# Patient Record
Sex: FEMALE | Race: OTHER | HISPANIC OR LATINO | URBAN - METROPOLITAN AREA
[De-identification: names, ages, dates, MRNs, and addresses within clinical notes are randomized per-mention and may not be internally consistent; named-entity substitution may affect disease eponyms.]

---

## 2020-01-20 ENCOUNTER — EMERGENCY (EMERGENCY)
Facility: HOSPITAL | Age: 65
LOS: 1 days | Discharge: HOME | End: 2020-01-20
Attending: EMERGENCY MEDICINE | Admitting: EMERGENCY MEDICINE
Payer: COMMERCIAL

## 2020-01-20 VITALS
DIASTOLIC BLOOD PRESSURE: 67 MMHG | HEART RATE: 77 BPM | OXYGEN SATURATION: 98 % | SYSTOLIC BLOOD PRESSURE: 140 MMHG | TEMPERATURE: 98 F | RESPIRATION RATE: 16 BRPM

## 2020-01-20 VITALS
TEMPERATURE: 98 F | OXYGEN SATURATION: 97 % | DIASTOLIC BLOOD PRESSURE: 75 MMHG | HEART RATE: 85 BPM | RESPIRATION RATE: 17 BRPM | SYSTOLIC BLOOD PRESSURE: 143 MMHG

## 2020-01-20 PROCEDURE — 73502 X-RAY EXAM HIP UNI 2-3 VIEWS: CPT | Mod: 26,LT

## 2020-01-20 PROCEDURE — 73130 X-RAY EXAM OF HAND: CPT | Mod: 26,RT

## 2020-01-20 PROCEDURE — 99284 EMERGENCY DEPT VISIT MOD MDM: CPT

## 2020-01-20 PROCEDURE — 70450 CT HEAD/BRAIN W/O DYE: CPT | Mod: 26

## 2020-01-20 RX ORDER — ACETAMINOPHEN 500 MG
975 TABLET ORAL ONCE
Refills: 0 | Status: COMPLETED | OUTPATIENT
Start: 2020-01-20 | End: 2020-01-20

## 2020-01-20 RX ADMIN — Medication 975 MILLIGRAM(S): at 14:49

## 2020-01-20 NOTE — ED PROVIDER NOTE - NS ED ROS FT
Gen: Denies fever  CV: Denies chest pain  Skin: Denies rash, erythema  Resp: Denies SOB, cough  GI: Denies abd pain, nausea, vomiting  Msk: + L hip pain, R finger pain Denies back pain, LE swelling, extremity pain  Neuro: +headache Denies LOC, weakness
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
## 2020-01-20 NOTE — ED PROVIDER NOTE - PHYSICAL EXAMINATION
Gen: WDWN, NAD, tearful during interview   HEENT: AT/NC, EOMI, no nasal discharge, mucous membranes moist, tenderness at the lateral aspect of the L eye, no bruising, erythema or obvious deformity.   Neck: supple, full ROM without pain  CV: RRR, +S1/S2, no M/R/G, no chest wall tenderness   Resp: CTAB, no W/R/R  GI: Abdomen soft non-distended, NTTP  MSK: No open wounds, no bruising, no LE edema. no pelvic instability. ttp of the L lateral hip, able to range with mild discomfort. ambulating well with smooth gait.   Neuro: A&Ox4, following commands, moving all four extremities spontaneously, sensation intact, strength intact.   Psych: appropriate mood
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
## 2020-01-20 NOTE — ED PROVIDER NOTE - NSFOLLOWUPINSTRUCTIONS_ED_ALL_ED_FT
you were seen today for left hip pain and left head pain.     you had xrays and a CT performed which didn't show any emergent finding.     You can take tylenol 500-1000mg or motrin 600mg for pain as needed.    Follow up with your primary care doctor this week.     Return to the ED if you have any new or worsening pain including but not limited to the following: worsening/severe pain, difficulty walking, numbness tingling.

## 2020-01-20 NOTE — ED PROVIDER NOTE - PATIENT PORTAL LINK FT
You can access the FollowMyHealth Patient Portal offered by [facility] by registering at the following website: [email or web address]. By joining [email or web address]’s FollowMyHealth portal, you will also be able to view your health information using other applications (apps) compatible with our system.

## 2020-06-03 ENCOUNTER — RESULT REVIEW (OUTPATIENT)
Age: 65
End: 2020-06-03

## 2020-09-16 ENCOUNTER — RESULT REVIEW (OUTPATIENT)
Age: 65
End: 2020-09-16

## 2020-12-09 ENCOUNTER — EMERGENCY (EMERGENCY)
Facility: HOSPITAL | Age: 65
LOS: 1 days | Discharge: HOME | End: 2020-12-09
Attending: EMERGENCY MEDICINE | Admitting: EMERGENCY MEDICINE
Payer: COMMERCIAL

## 2020-12-09 VITALS
OXYGEN SATURATION: 98 % | DIASTOLIC BLOOD PRESSURE: 79 MMHG | TEMPERATURE: 98 F | RESPIRATION RATE: 18 BRPM | HEART RATE: 85 BPM | SYSTOLIC BLOOD PRESSURE: 130 MMHG

## 2020-12-09 DIAGNOSIS — O34.219 MATERNAL CARE FOR UNSPECIFIED TYPE SCAR FROM PREVIOUS CESAREAN DELIVERY: Chronic | ICD-10-CM

## 2020-12-09 LAB
ALBUMIN SERPL ELPH-MCNC: 3.9 G/DL — SIGNIFICANT CHANGE UP (ref 3.3–5)
ALP SERPL-CCNC: 107 U/L — SIGNIFICANT CHANGE UP (ref 40–120)
ALT FLD-CCNC: 30 U/L — SIGNIFICANT CHANGE UP (ref 4–33)
ANION GAP SERPL CALC-SCNC: 8 MMOL/L — SIGNIFICANT CHANGE UP (ref 7–14)
APPEARANCE UR: CLEAR — SIGNIFICANT CHANGE UP
AST SERPL-CCNC: 20 U/L — SIGNIFICANT CHANGE UP (ref 4–32)
BASOPHILS # BLD AUTO: 0.01 K/UL — SIGNIFICANT CHANGE UP (ref 0–0.2)
BASOPHILS NFR BLD AUTO: 0.2 % — SIGNIFICANT CHANGE UP (ref 0–2)
BILIRUB SERPL-MCNC: <0.2 MG/DL — SIGNIFICANT CHANGE UP (ref 0.2–1.2)
BILIRUB UR-MCNC: NEGATIVE — SIGNIFICANT CHANGE UP
BUN SERPL-MCNC: 10 MG/DL — SIGNIFICANT CHANGE UP (ref 7–23)
CALCIUM SERPL-MCNC: 9.2 MG/DL — SIGNIFICANT CHANGE UP (ref 8.4–10.5)
CHLORIDE SERPL-SCNC: 104 MMOL/L — SIGNIFICANT CHANGE UP (ref 98–107)
CO2 SERPL-SCNC: 28 MMOL/L — SIGNIFICANT CHANGE UP (ref 22–31)
COLOR SPEC: SIGNIFICANT CHANGE UP
CREAT SERPL-MCNC: 0.7 MG/DL — SIGNIFICANT CHANGE UP (ref 0.5–1.3)
DIFF PNL FLD: ABNORMAL
EOSINOPHIL # BLD AUTO: 0.08 K/UL — SIGNIFICANT CHANGE UP (ref 0–0.5)
EOSINOPHIL NFR BLD AUTO: 1.3 % — SIGNIFICANT CHANGE UP (ref 0–6)
GLUCOSE SERPL-MCNC: 93 MG/DL — SIGNIFICANT CHANGE UP (ref 70–99)
GLUCOSE UR QL: NEGATIVE — SIGNIFICANT CHANGE UP
HCT VFR BLD CALC: 43.1 % — SIGNIFICANT CHANGE UP (ref 34.5–45)
HGB BLD-MCNC: 13.3 G/DL — SIGNIFICANT CHANGE UP (ref 11.5–15.5)
IANC: 3.8 K/UL — SIGNIFICANT CHANGE UP (ref 1.5–8.5)
IMM GRANULOCYTES NFR BLD AUTO: 0.5 % — SIGNIFICANT CHANGE UP (ref 0–1.5)
KETONES UR-MCNC: NEGATIVE — SIGNIFICANT CHANGE UP
LEUKOCYTE ESTERASE UR-ACNC: NEGATIVE — SIGNIFICANT CHANGE UP
LYMPHOCYTES # BLD AUTO: 1.67 K/UL — SIGNIFICANT CHANGE UP (ref 1–3.3)
LYMPHOCYTES # BLD AUTO: 26.6 % — SIGNIFICANT CHANGE UP (ref 13–44)
MCHC RBC-ENTMCNC: 29.3 PG — SIGNIFICANT CHANGE UP (ref 27–34)
MCHC RBC-ENTMCNC: 30.9 GM/DL — LOW (ref 32–36)
MCV RBC AUTO: 94.9 FL — SIGNIFICANT CHANGE UP (ref 80–100)
MONOCYTES # BLD AUTO: 0.69 K/UL — SIGNIFICANT CHANGE UP (ref 0–0.9)
MONOCYTES NFR BLD AUTO: 11 % — SIGNIFICANT CHANGE UP (ref 2–14)
NEUTROPHILS # BLD AUTO: 3.8 K/UL — SIGNIFICANT CHANGE UP (ref 1.8–7.4)
NEUTROPHILS NFR BLD AUTO: 60.4 % — SIGNIFICANT CHANGE UP (ref 43–77)
NITRITE UR-MCNC: NEGATIVE — SIGNIFICANT CHANGE UP
NRBC # BLD: 0 /100 WBCS — SIGNIFICANT CHANGE UP
NRBC # FLD: 0 K/UL — SIGNIFICANT CHANGE UP
PH UR: 6.5 — SIGNIFICANT CHANGE UP (ref 5–8)
PLATELET # BLD AUTO: 282 K/UL — SIGNIFICANT CHANGE UP (ref 150–400)
POTASSIUM SERPL-MCNC: 3.8 MMOL/L — SIGNIFICANT CHANGE UP (ref 3.5–5.3)
POTASSIUM SERPL-SCNC: 3.8 MMOL/L — SIGNIFICANT CHANGE UP (ref 3.5–5.3)
PROT SERPL-MCNC: 6.9 G/DL — SIGNIFICANT CHANGE UP (ref 6–8.3)
PROT UR-MCNC: NEGATIVE — SIGNIFICANT CHANGE UP
RBC # BLD: 4.54 M/UL — SIGNIFICANT CHANGE UP (ref 3.8–5.2)
RBC # FLD: 13.5 % — SIGNIFICANT CHANGE UP (ref 10.3–14.5)
SODIUM SERPL-SCNC: 140 MMOL/L — SIGNIFICANT CHANGE UP (ref 135–145)
SP GR SPEC: 1.01 — LOW (ref 1.01–1.02)
UROBILINOGEN FLD QL: SIGNIFICANT CHANGE UP
WBC # BLD: 6.28 K/UL — SIGNIFICANT CHANGE UP (ref 3.8–10.5)
WBC # FLD AUTO: 6.28 K/UL — SIGNIFICANT CHANGE UP (ref 3.8–10.5)

## 2020-12-09 PROCEDURE — 74177 CT ABD & PELVIS W/CONTRAST: CPT | Mod: 26

## 2020-12-09 PROCEDURE — 99284 EMERGENCY DEPT VISIT MOD MDM: CPT

## 2020-12-09 RX ORDER — MOXIFLOXACIN HYDROCHLORIDE TABLETS, 400 MG 400 MG/1
1 TABLET, FILM COATED ORAL
Qty: 20 | Refills: 0
Start: 2020-12-09 | End: 2020-12-18

## 2020-12-09 RX ORDER — METRONIDAZOLE 500 MG
1 TABLET ORAL
Qty: 30 | Refills: 0
Start: 2020-12-09 | End: 2020-12-18

## 2020-12-09 RX ORDER — SODIUM CHLORIDE 9 MG/ML
1000 INJECTION INTRAMUSCULAR; INTRAVENOUS; SUBCUTANEOUS ONCE
Refills: 0 | Status: COMPLETED | OUTPATIENT
Start: 2020-12-09 | End: 2020-12-09

## 2020-12-09 RX ORDER — OXYCODONE AND ACETAMINOPHEN 5; 325 MG/1; MG/1
1 TABLET ORAL ONCE
Refills: 0 | Status: DISCONTINUED | OUTPATIENT
Start: 2020-12-09 | End: 2020-12-09

## 2020-12-09 RX ORDER — METRONIDAZOLE 500 MG
500 TABLET ORAL ONCE
Refills: 0 | Status: COMPLETED | OUTPATIENT
Start: 2020-12-09 | End: 2020-12-09

## 2020-12-09 RX ORDER — MORPHINE SULFATE 50 MG/1
4 CAPSULE, EXTENDED RELEASE ORAL ONCE
Refills: 0 | Status: DISCONTINUED | OUTPATIENT
Start: 2020-12-09 | End: 2020-12-09

## 2020-12-09 RX ORDER — CIPROFLOXACIN LACTATE 400MG/40ML
500 VIAL (ML) INTRAVENOUS ONCE
Refills: 0 | Status: COMPLETED | OUTPATIENT
Start: 2020-12-09 | End: 2020-12-09

## 2020-12-09 RX ADMIN — MORPHINE SULFATE 4 MILLIGRAM(S): 50 CAPSULE, EXTENDED RELEASE ORAL at 12:18

## 2020-12-09 RX ADMIN — Medication 500 MILLIGRAM(S): at 14:44

## 2020-12-09 RX ADMIN — OXYCODONE AND ACETAMINOPHEN 1 TABLET(S): 5; 325 TABLET ORAL at 14:52

## 2020-12-09 RX ADMIN — SODIUM CHLORIDE 1000 MILLILITER(S): 9 INJECTION INTRAMUSCULAR; INTRAVENOUS; SUBCUTANEOUS at 12:18

## 2020-12-09 NOTE — ED PROVIDER NOTE - PROGRESS NOTE DETAILS
CARLOS EDUARDO Meza: Pt reassessed and notes improved pain though not completely resolved. Pt offered admission/CDU vs D/C with pain control and abx, pt prefers to go home, will provide return precautions and D/C.

## 2020-12-09 NOTE — ED PROVIDER NOTE - CLINICAL SUMMARY MEDICAL DECISION MAKING FREE TEXT BOX
64 y/o female with h/o diverticulitis p/w diarrhea abd pain.  Likely diverticulitis vs colitis vs viral syndrome.  Obtain cbc cmp ct a/p give ivf pain med reassess.

## 2020-12-09 NOTE — ED PROVIDER NOTE - OBJECTIVE STATEMENT
65 Y/O F 64 y/o female h/o diverticulitis here with c/o lower abd pain and diarrhea since yesterday.  Feels similar to diverticulitis in the past.  Contrary to triage note denies any pelvic pain.  Denies f/c, dysuria.

## 2020-12-09 NOTE — ED PROVIDER NOTE - PATIENT PORTAL LINK FT
You can access the FollowMyHealth Patient Portal offered by [facility] by registering at the following website: [email or web address]. By joining [email or web address]’s FollowMyHealth portal, you will also be able to view your health information using other applications (apps) compatible with our system.

## 2020-12-09 NOTE — ED PROVIDER NOTE - ATTENDING CONTRIBUTION TO CARE
DR. MANDUJANO, ATTENDING MD-  I performed a face to face bedside interview with the patient regarding history of present illness, review of symptoms and past medical history. I completed an independent physical exam.  I have discussed the patient's plan of care with the PA.   Documentation as above in the note.    HPI / ROS / PE / MDM written by myself.

## 2020-12-09 NOTE — ED PROVIDER NOTE - NSFOLLOWUPINSTRUCTIONS_ED_ALL_ED_FT
Take Cipro every 12 hours for pain and Flagyl every 8 hours. Take Percocet every 6 hours as needed for pain, do not drive after use. Follow up with your primary care physician in 48-72 hours- bring copies of your results.  Return to the ER for worsening or persistent symptoms, and/or ANY NEW OR CONCERNING SYMPTOMS. THIS INCLUDES BUT IS NOT LIMITED TO FEVER, CHILLS, NIGHTSWEATS, INCREASING PAIN, NAUSEA, VOMITING OR FOR ANY OTHER CHANGES THAT CONCERN YOU. If you have issues obtaining follow up, please call: [phone] to obtain a doctor or specialist who takes your insurance in your area.  You may call [phone] to make an appointment with the internal medicine clinic.

## 2020-12-09 NOTE — ED ADULT NURSE NOTE - OBJECTIVE STATEMENT
pt received at intake rm 9 AAO x 3. pt reports intermittent b/l lower abdominal pain for several days. pt c/o pain is constant now associated with diarrhea since yesterday. pt denies sob, chest pain, n/v, fevers, chills, cough, urinary symptoms, abnormal vaginal d/c. respirations even and unlabored. 20g iv placed to left ac.

## 2020-12-09 NOTE — ED ADULT TRIAGE NOTE - CHIEF COMPLAINT QUOTE
lower pelvic pain since yesterday with diarrhea. denies dysuria or hematuria denies vaginal bleeding.

## 2020-12-10 LAB
CULTURE RESULTS: SIGNIFICANT CHANGE UP
SPECIMEN SOURCE: SIGNIFICANT CHANGE UP

## 2020-12-14 PROBLEM — K57.92 DIVERTICULITIS OF INTESTINE, PART UNSPECIFIED, WITHOUT PERFORATION OR ABSCESS WITHOUT BLEEDING: Chronic | Status: ACTIVE | Noted: 2020-12-09

## 2020-12-15 ENCOUNTER — APPOINTMENT (OUTPATIENT)
Dept: ORTHOPEDIC SURGERY | Facility: CLINIC | Age: 65
End: 2020-12-15
Payer: COMMERCIAL

## 2020-12-15 VITALS
WEIGHT: 150 LBS | BODY MASS INDEX: 26.58 KG/M2 | SYSTOLIC BLOOD PRESSURE: 146 MMHG | DIASTOLIC BLOOD PRESSURE: 79 MMHG | HEIGHT: 63 IN | OXYGEN SATURATION: 96 % | HEART RATE: 89 BPM

## 2020-12-15 PROCEDURE — 99072 ADDL SUPL MATRL&STAF TM PHE: CPT

## 2020-12-15 PROCEDURE — 99204 OFFICE O/P NEW MOD 45 MIN: CPT

## 2020-12-15 NOTE — PHYSICAL EXAM
[de-identified] : Examination of the lumbar spine discloses tenderness to palpation in the mid and left lumbar region. Mild positive left leg raise at 65 degrees.\par Forward flexion 75 degrees.  DTRs 2+ equal No acute motor or sensory deficits to the LEs \par Examination of the hips is unremarkable [de-identified] : Review of prior xrays of the lumbar spine disclosed degenerative changes with spondylolethsesis

## 2020-12-15 NOTE — HISTORY OF PRESENT ILLNESS
[___ mths] : [unfilled] month(s) ago [7] : an average pain level of 7/10 [0] : a minimum pain level of 0/10 [10] : a maximum pain level of 10/10 [Standing] : standing [Intermit.] : ~He/She~ states the symptoms seem to be intermittent [Bending] : worsened by bending [Sitting] : worsened by sitting [Walking] : worsened by walking [Rest] : relieved by rest [de-identified] : Pt presents for initial evaluation with pain in her hips, pt is pointing to her right and left buttocks. Pt states there is no known injury, she states she has radiating pain to the left calf. Pt has taken Naprosyn with some relief.  Pt has not had any injections, pt has had physical therapy for her back approx. 3/4 years ago.Pt has seen her PCP Dr Jamaal Bowden who order xrays of her LS spine .11/11/20 Pt has diverticulosis. [de-identified] : certain movements. [de-identified] : medication

## 2020-12-15 NOTE — DISCUSSION/SUMMARY
[de-identified] : The patient was advised of her findings. She was referred to [facility] and given the name of a spine specialist for further definitive care and treatment

## 2020-12-21 ENCOUNTER — APPOINTMENT (OUTPATIENT)
Dept: ORTHOPEDIC SURGERY | Facility: CLINIC | Age: 65
End: 2020-12-21
Payer: COMMERCIAL

## 2020-12-21 VITALS
HEART RATE: 87 BPM | BODY MASS INDEX: 26.58 KG/M2 | DIASTOLIC BLOOD PRESSURE: 79 MMHG | WEIGHT: 150 LBS | HEIGHT: 63 IN | SYSTOLIC BLOOD PRESSURE: 134 MMHG

## 2020-12-21 PROCEDURE — 72110 X-RAY EXAM L-2 SPINE 4/>VWS: CPT

## 2020-12-21 PROCEDURE — 99072 ADDL SUPL MATRL&STAF TM PHE: CPT

## 2020-12-21 PROCEDURE — 99215 OFFICE O/P EST HI 40 MIN: CPT

## 2020-12-21 NOTE — PHYSICAL EXAM
[de-identified] : Lumbar Physical Exam\par \par Gait -slightly antalgic\par \par Station -normal\par \par Sagittal balance -normal\par \par Compensatory mechanism? -None\par \par Heel walk -normal\par \par Toe walk -normal\par \par Reflexes\par Patellar -normal\par Gastroc -normal\par Clonus -no\par \par Hip Exam -normal\par \par Straight leg raise -positive on the left\par \par Pulses -2+ DP/PT\par \par Range of motion -globally reduced\par \par Sensation \par Sensation intact to light touch in L1, L2, L3, L4, L5 and S1 dermatomes bilaterally\par \par Motor\par 	IP	Quad	HS	TA	Gastroc	EHL\par Right	5/5	5/5	5/5	5/5	5/5	5/5\par Left	4/5	5/5	5/5	5/5	5/5	5/5\par \par  [de-identified] : Lumbar radiographs\par Lumbar lordosis maintained\par Degenerative scoliosis noted on AP films\par L4-L5 spondylolisthesis noted\par I do note motion on flexion-extension/lateral films

## 2020-12-21 NOTE — HISTORY OF PRESENT ILLNESS
[de-identified] : This is a 55-year-old female here today for evaluation of her low back and left radicular type symptoms.  The symptoms have been ongoing for the past 3 years.  Unfortunately the past 6 to 8 months he has had acute worsening of the symptoms.  She has tried physical therapy and NSAIDs but has had limited symptom relief.  She can only walk 3 blocks when her pain is bad.  Down her left leg she has numbness and pain over her left lateral thigh into her left lateral calf.  She does endorse mechanical back pain and that she has pain when she gets up from a sitting position.

## 2020-12-21 NOTE — ASSESSMENT
[FreeTextEntry1] : This is a 65-year-old female here today for very low back and lumbar radicular type symptoms.  These have been worsening over the past 6 weeks despite conservative treatment which has included physical therapy, NSAIDs.  Therefore I would like to obtain a lumbar MRI.  I would like her to continue with physical therapy with new modalities of treatment including core strengthening and lumbar stretching.  I did  the patient on appropriate regimen of Tylenol.  She did state that her NSAID use has caused her to have some stomach problems so I think she should stick with Tylenol at this point.  I will have her follow-up in 2 to 3 weeks to go over her imaging findings and to discuss further treatment options.  She knows to call in interim if her symptoms change or worsen in any way.\par \par The patient has tried the following treatments:\par Activity modification          +\par Ice/Compression                  +\par Braces                                     -\par NSAIDS                                   +\par Physical Therapy                   + \par Please note these modalities of treatment have been attempted for more than 6 weeks\par

## 2021-01-06 ENCOUNTER — OUTPATIENT (OUTPATIENT)
Dept: OUTPATIENT SERVICES | Facility: HOSPITAL | Age: 66
LOS: 1 days | End: 2021-01-06
Payer: COMMERCIAL

## 2021-01-06 ENCOUNTER — RESULT REVIEW (OUTPATIENT)
Age: 66
End: 2021-01-06

## 2021-01-06 ENCOUNTER — APPOINTMENT (OUTPATIENT)
Dept: MRI IMAGING | Facility: IMAGING CENTER | Age: 66
End: 2021-01-06
Payer: COMMERCIAL

## 2021-01-06 DIAGNOSIS — M54.16 RADICULOPATHY, LUMBAR REGION: ICD-10-CM

## 2021-01-06 DIAGNOSIS — O34.219 MATERNAL CARE FOR UNSPECIFIED TYPE SCAR FROM PREVIOUS CESAREAN DELIVERY: Chronic | ICD-10-CM

## 2021-01-06 PROCEDURE — 72148 MRI LUMBAR SPINE W/O DYE: CPT | Mod: 26

## 2021-01-06 PROCEDURE — 72148 MRI LUMBAR SPINE W/O DYE: CPT

## 2021-01-13 ENCOUNTER — APPOINTMENT (OUTPATIENT)
Dept: ORTHOPEDIC SURGERY | Facility: CLINIC | Age: 66
End: 2021-01-13
Payer: COMMERCIAL

## 2021-01-13 VITALS
HEIGHT: 63 IN | WEIGHT: 150 LBS | SYSTOLIC BLOOD PRESSURE: 145 MMHG | DIASTOLIC BLOOD PRESSURE: 85 MMHG | BODY MASS INDEX: 26.58 KG/M2 | HEART RATE: 76 BPM | OXYGEN SATURATION: 96 %

## 2021-01-13 PROCEDURE — 99214 OFFICE O/P EST MOD 30 MIN: CPT

## 2021-01-13 PROCEDURE — 99072 ADDL SUPL MATRL&STAF TM PHE: CPT

## 2021-01-13 NOTE — ASSESSMENT
[FreeTextEntry1] : I had a lengthy discussion with the patient in terms of her treatment plan and diagnosis.  She certainly has canal stenosis and motion at L4-L5 which is likely contributing to her back and leg pain.  Given her symptoms and imaging findings I do think we should first proceed with a lumbar epidural steroid injection.  I prescribed her an L4-L5 translaminar epidural steroid injection which I am hopeful will be helpful for her symptoms.  I will see her again in 2 to 3 weeks for repeat clinical evaluation.  She knows to call back sooner if her symptoms worsen or change.

## 2021-01-13 NOTE — PHYSICAL EXAM
[de-identified] : Lumbar Physical Exam\par \par Gait -slightly antalgic\par \par Station -normal\par \par Sagittal balance -normal\par \par Compensatory mechanism? -None\par \par Heel walk -normal\par \par Toe walk -normal\par \par Reflexes\par Patellar -normal\par Gastroc -normal\par Clonus -no\par \par Hip Exam -normal\par \par Straight leg raise -positive on the left\par \par Pulses -2+ DP/PT\par \par Range of motion -globally reduced\par \par Sensation \par Sensation intact to light touch in L1, L2, L3, L4, L5 and S1 dermatomes bilaterally\par \par Motor\par 	IP	Quad	HS	TA	Gastroc	EHL\par Right	5/5	5/5	5/5	5/5	5/5	5/5\par Left	4/5	5/5	5/5	5/5	5/5	5/5\par \par Exam unchanged today [de-identified] : Lumbar MRI\par Moderate canal stenosis at L4-L5\par Grade 1 spondylolisthesis of L4 on L5\par Spondylosis noted at L4-L5 as well

## 2021-01-13 NOTE — HISTORY OF PRESENT ILLNESS
[de-identified] : This is a 55-year-old female here today for re-evaluation of her low back and left radicular type symptoms.  The symptoms have been ongoing for the past 3 years.  Unfortunately the past 6 to 8 months he has had acute worsening of the symptoms.  She has tried physical therapy and NSAIDs but has had limited symptom relief.  She can only walk 3 blocks when her pain is bad.  Down her left leg she has numbness and pain over her left lateral thigh into her left lateral calf.  She does endorse mechanical back pain and that she has pain when she gets up from a sitting position.\par \par The patient states her symptoms are unchanged today.

## 2021-01-25 ENCOUNTER — APPOINTMENT (OUTPATIENT)
Dept: PHYSICAL MEDICINE AND REHAB | Facility: CLINIC | Age: 66
End: 2021-01-25

## 2021-02-09 ENCOUNTER — APPOINTMENT (OUTPATIENT)
Dept: ORTHOPEDIC SURGERY | Facility: CLINIC | Age: 66
End: 2021-02-09

## 2021-03-30 ENCOUNTER — APPOINTMENT (OUTPATIENT)
Dept: GASTROENTEROLOGY | Facility: CLINIC | Age: 66
End: 2021-03-30
Payer: COMMERCIAL

## 2021-03-30 VITALS
WEIGHT: 150 LBS | SYSTOLIC BLOOD PRESSURE: 120 MMHG | BODY MASS INDEX: 26.58 KG/M2 | DIASTOLIC BLOOD PRESSURE: 70 MMHG | HEART RATE: 74 BPM | HEIGHT: 63 IN | RESPIRATION RATE: 12 BRPM

## 2021-03-30 DIAGNOSIS — R93.3 ABNORMAL FINDINGS ON DIAGNOSTIC IMAGING OF OTHER PARTS OF DIGESTIVE TRACT: ICD-10-CM

## 2021-03-30 PROCEDURE — 99214 OFFICE O/P EST MOD 30 MIN: CPT | Mod: 25

## 2021-03-30 PROCEDURE — 36415 COLL VENOUS BLD VENIPUNCTURE: CPT

## 2021-03-30 PROCEDURE — 99072 ADDL SUPL MATRL&STAF TM PHE: CPT

## 2021-03-30 RX ORDER — OXYCODONE AND ACETAMINOPHEN 5; 325 MG/1; MG/1
5-325 TABLET ORAL
Qty: 8 | Refills: 0 | Status: DISCONTINUED | COMMUNITY
Start: 2020-12-09

## 2021-03-30 RX ORDER — ASPIRIN 81 MG/1
81 TABLET, CHEWABLE ORAL
Qty: 90 | Refills: 0 | Status: ACTIVE | COMMUNITY
Start: 2021-03-15

## 2021-03-30 RX ORDER — ERGOCALCIFEROL 1.25 MG/1
1.25 MG CAPSULE, LIQUID FILLED ORAL
Qty: 12 | Refills: 0 | Status: ACTIVE | COMMUNITY
Start: 2021-03-15

## 2021-03-30 RX ORDER — MENTHOL 4.5 MG/1
5-2 LOZENGE ORAL
Qty: 28 | Refills: 0 | Status: DISCONTINUED | COMMUNITY
Start: 2021-03-22

## 2021-03-30 RX ORDER — CHOLECALCIFEROL (VITAMIN D3) 1250 MCG
1.25 MG CAPSULE ORAL
Qty: 12 | Refills: 0 | Status: DISCONTINUED | COMMUNITY
Start: 2020-11-17

## 2021-03-30 RX ORDER — CIPROFLOXACIN HYDROCHLORIDE 500 MG/1
500 TABLET, FILM COATED ORAL
Qty: 20 | Refills: 0 | Status: DISCONTINUED | COMMUNITY
Start: 2020-12-09

## 2021-03-30 RX ORDER — MELOXICAM 7.5 MG/1
7.5 TABLET ORAL
Qty: 30 | Refills: 0 | Status: DISCONTINUED | COMMUNITY
Start: 2021-03-15

## 2021-03-30 NOTE — HISTORY OF PRESENT ILLNESS
[___ Month(s) Ago] : [unfilled] month(s) ago [None] : no changes [GERD] : no gastroesophageal reflux disease [Hiatus Hernia] : no hiatus hernia [Peptic Ulcer Disease] : no peptic ulcer disease [Pancreatitis] : no pancreatitis [Cholelithiasis] : no cholelithiasis [Kidney Stone] : no kidney stone [Inflammatory Bowel Disease] : no inflammatory bowel disease [Irritable Bowel Syndrome] : no irritable bowel syndrome [Diverticulitis] : diverticulitis [Alcohol Abuse] : no alcohol abuse [Malignancy] : no malignancy [Abdominal Surgery] : no abdominal surgery [Appendectomy] : no appendectomy [Cholecystectomy] : no cholecystectomy [_________] : Performed [unfilled] [Good Compliance] : good compliance with treatment [de-identified] : diverticulitis [de-identified] : Thw pt Is a 65-year-old female with a history of diverticulosis who last underwent colonoscopy in 2018 when she also had endoscopy.  Recently at [facility] emergency room with diverticulitis on CAT scan.  She was prescribed Cipro and Flagyl.  She did well but now her symptoms are recurring.  There is no fever or chills or change in bowel habits. [de-identified] : gastritis [de-identified] : diverticulosis

## 2021-03-30 NOTE — PHYSICAL EXAM
[General Appearance - Alert] : alert [General Appearance - In No Acute Distress] : in no acute distress [Sclera] : the sclera and conjunctiva were normal [PERRL With Normal Accommodation] : pupils were equal in size, round, and reactive to light [Outer Ear] : the ears and nose were normal in appearance [Hearing Threshold Finger Rub Not [place]] : hearing was normal [Neck Appearance] : the appearance of the neck was normal [Neck Cervical Mass (___cm)] : no neck mass was observed [Jugular Venous Distention Increased] : there was no jugular-venous distention [Respiration, Rhythm And Depth] : normal respiratory rhythm and effort [Exaggerated Use Of Accessory Muscles For Inspiration] : no accessory muscle use [Apical Impulse] : the apical impulse was normal [Heart Sounds] : normal S1 and S2 [Arterial Pulses Carotid] : carotid pulses were normal with no bruits [Full Pulse] : the pedal pulses are present [Bowel Sounds] : normal bowel sounds [Abdomen Soft] : soft [] : no hepato-splenomegaly [FreeTextEntry1] : min llq discomfort to palpation [No CVA Tenderness] : no ~M costovertebral angle tenderness [No Spinal Tenderness] : no spinal tenderness [Abnormal Walk] : normal gait [Involuntary Movements] : no involuntary movements were seen [Musculoskeletal - Swelling] : no joint swelling seen [Skin Color & Pigmentation] : normal skin color and pigmentation [Skin Turgor] : normal skin turgor [Cranial Nerves] : cranial nerves 2-12 were intact [No Focal Deficits] : no focal deficits [Affect] : the affect was normal

## 2021-03-30 NOTE — ASSESSMENT
[FreeTextEntry1] : 65-year-old female with history of diverticulosis or recurrent diverticulitis with mild symptoms.  She requires antibiotics given some focal tenderness.  Will use Augmentin twice a day for 10 days and she will be seen in follow-up within 1 month.  She understands the need to contact me if her symptoms persist at which time we will consider repeat imaging versus surgical consultation. She requested pain medication but we will use dicyclomine for spasm.

## 2021-03-30 NOTE — REVIEW OF SYSTEMS
[Fever] : no fever [Chills] : no chills [Chest Pain] : no chest pain [Palpitations] : no palpitations [As Noted in HPI] : as noted in HPI [Abdominal Pain] : abdominal pain [Vomiting] : no vomiting [Negative] : Heme/Lymph

## 2021-03-31 LAB
BASOPHILS # BLD AUTO: 0.03 K/UL
BASOPHILS NFR BLD AUTO: 0.7 %
COVID-19 NUCLEOCAPSID  GAM ANTIBODY INTERPRETATION: POSITIVE
EOSINOPHIL # BLD AUTO: 0.07 K/UL
EOSINOPHIL NFR BLD AUTO: 1.6 %
HCT VFR BLD CALC: 42.5 %
HGB BLD-MCNC: 13.7 G/DL
IMM GRANULOCYTES NFR BLD AUTO: 0.2 %
LYMPHOCYTES # BLD AUTO: 2.19 K/UL
LYMPHOCYTES NFR BLD AUTO: 51.5 %
MAN DIFF?: NORMAL
MCHC RBC-ENTMCNC: 30.4 PG
MCHC RBC-ENTMCNC: 32.2 GM/DL
MCV RBC AUTO: 94.4 FL
MONOCYTES # BLD AUTO: 0.4 K/UL
MONOCYTES NFR BLD AUTO: 9.4 %
NEUTROPHILS # BLD AUTO: 1.55 K/UL
NEUTROPHILS NFR BLD AUTO: 36.6 %
PLATELET # BLD AUTO: 299 K/UL
RBC # BLD: 4.5 M/UL
RBC # FLD: 14 %
SARS-COV-2 AB SERPL QL IA: 19.6 INDEX
WBC # FLD AUTO: 4.25 K/UL

## 2021-04-01 LAB
ALBUMIN SERPL ELPH-MCNC: 4.2 G/DL
ALP BLD-CCNC: 96 U/L
ALT SERPL-CCNC: 18 U/L
ANION GAP SERPL CALC-SCNC: 14 MMOL/L
AST SERPL-CCNC: 21 U/L
BILIRUB SERPL-MCNC: <0.2 MG/DL
BUN SERPL-MCNC: 16 MG/DL
CALCIUM SERPL-MCNC: 10 MG/DL
CHLORIDE SERPL-SCNC: 108 MMOL/L
CO2 SERPL-SCNC: 21 MMOL/L
CREAT SERPL-MCNC: 0.7 MG/DL
GLUCOSE SERPL-MCNC: 92 MG/DL
POTASSIUM SERPL-SCNC: 4.3 MMOL/L
PROT SERPL-MCNC: 6.9 G/DL
SODIUM SERPL-SCNC: 142 MMOL/L

## 2021-04-19 ENCOUNTER — APPOINTMENT (OUTPATIENT)
Dept: GASTROENTEROLOGY | Facility: CLINIC | Age: 66
End: 2021-04-19
Payer: COMMERCIAL

## 2021-04-19 VITALS
SYSTOLIC BLOOD PRESSURE: 122 MMHG | HEIGHT: 63 IN | WEIGHT: 153 LBS | DIASTOLIC BLOOD PRESSURE: 68 MMHG | RESPIRATION RATE: 12 BRPM | BODY MASS INDEX: 27.11 KG/M2 | HEART RATE: 70 BPM

## 2021-04-19 DIAGNOSIS — I87.2 VENOUS INSUFFICIENCY (CHRONIC) (PERIPHERAL): ICD-10-CM

## 2021-04-19 DIAGNOSIS — R93.3 ABNORMAL FINDINGS ON DIAGNOSTIC IMAGING OF OTHER PARTS OF DIGESTIVE TRACT: ICD-10-CM

## 2021-04-19 DIAGNOSIS — I83.819 VARICOSE VEINS OF UNSPECIFIED LOWER EXTREMITY WITH PAIN: ICD-10-CM

## 2021-04-19 PROCEDURE — 99072 ADDL SUPL MATRL&STAF TM PHE: CPT

## 2021-04-19 PROCEDURE — 99213 OFFICE O/P EST LOW 20 MIN: CPT

## 2021-04-19 RX ORDER — AMOXICILLIN AND CLAVULANATE POTASSIUM 875; 125 MG/1; MG/1
875-125 TABLET, COATED ORAL
Qty: 2 | Refills: 10 | Status: DISCONTINUED | COMMUNITY
Start: 2021-03-30 | End: 2021-04-19

## 2021-04-19 NOTE — HISTORY OF PRESENT ILLNESS
[___ Week(s) Ago] : [unfilled] week(s) ago [Diverticulitis] : diverticulitis [_________] : Performed [unfilled] [Good Compliance] : good compliance with treatment [GERD] : no gastroesophageal reflux disease [Hiatus Hernia] : no hiatus hernia [Peptic Ulcer Disease] : no peptic ulcer disease [Pancreatitis] : no pancreatitis [Cholelithiasis] : no cholelithiasis [Kidney Stone] : no kidney stone [Inflammatory Bowel Disease] : no inflammatory bowel disease [Irritable Bowel Syndrome] : no irritable bowel syndrome [Alcohol Abuse] : no alcohol abuse [Malignancy] : no malignancy [Abdominal Surgery] : no abdominal surgery [Appendectomy] : no appendectomy [Cholecystectomy] : no cholecystectomy [de-identified] : The patient returns in follow-up.  She has a history of diverticulitis but is feeling much better.  Her CBC was normal.  She does have a history of Covid 19 and now has been vaccinated with 2 doses of the vaccine as well.  She has no fever or chills and she has normal bowel movements.  She understands the need for high-fiber diet. [de-identified] : diverticulitis [de-identified] : Thw pt Is a 65-year-old female with a history of diverticulosis who last underwent colonoscopy in 2018 when she also had endoscopy.  Recently at [facility] emergency room with diverticulitis on CAT scan.  She was prescribed Cipro and Flagyl.  She did well but now her symptoms are recurring.  There is no fever or chills or change in bowel habits. [de-identified] : gastritis [de-identified] : diverticulosis

## 2021-04-19 NOTE — PHYSICAL EXAM
[General Appearance - Alert] : alert [General Appearance - In No Acute Distress] : in no acute distress [Sclera] : the sclera and conjunctiva were normal [PERRL With Normal Accommodation] : pupils were equal in size, round, and reactive to light [Outer Ear] : the ears and nose were normal in appearance [Hearing Threshold Finger Rub Not [place]] : hearing was normal [Neck Appearance] : the appearance of the neck was normal [Neck Cervical Mass (___cm)] : no neck mass was observed [Jugular Venous Distention Increased] : there was no jugular-venous distention [Respiration, Rhythm And Depth] : normal respiratory rhythm and effort [Exaggerated Use Of Accessory Muscles For Inspiration] : no accessory muscle use [Apical Impulse] : the apical impulse was normal [Heart Sounds] : normal S1 and S2 [Arterial Pulses Carotid] : carotid pulses were normal with no bruits [Full Pulse] : the pedal pulses are present [Bowel Sounds] : normal bowel sounds [Abdomen Soft] : soft [] : no hepato-splenomegaly [No CVA Tenderness] : no ~M costovertebral angle tenderness [Abnormal Walk] : normal gait [No Spinal Tenderness] : no spinal tenderness [Involuntary Movements] : no involuntary movements were seen [Musculoskeletal - Swelling] : no joint swelling seen [Skin Color & Pigmentation] : normal skin color and pigmentation [Skin Turgor] : normal skin turgor [Cranial Nerves] : cranial nerves 2-12 were intact [No Focal Deficits] : no focal deficits [Affect] : the affect was normal [FreeTextEntry1] : min llq discomfort to palpation

## 2021-04-19 NOTE — ASSESSMENT
[FreeTextEntry1] : 65-year-old female with history of osteoporosis and diverticulitis doing much better.  She understands the need for high-fiber diet and to drink 2 to 3 glasses of water a day.  Her last colonoscopy was in 2018 and the next one may be within 2 years.  If she has any fever or chills or change in bowel habit she understands the need to contact me.  Otherwise I will see her in 1 year.

## 2022-02-16 ENCOUNTER — RX RENEWAL (OUTPATIENT)
Age: 67
End: 2022-02-16

## 2022-03-14 ENCOUNTER — RX CHANGE (OUTPATIENT)
Age: 67
End: 2022-03-14

## 2022-03-14 RX ORDER — DICYCLOMINE HYDROCHLORIDE 10 MG/1
10 CAPSULE ORAL EVERY 6 HOURS
Qty: 120 | Refills: 0 | Status: DISCONTINUED | COMMUNITY
Start: 2021-03-30 | End: 2022-03-14

## 2022-12-15 ENCOUNTER — APPOINTMENT (OUTPATIENT)
Dept: UROLOGY | Facility: CLINIC | Age: 67
End: 2022-12-15

## 2023-02-16 ENCOUNTER — APPOINTMENT (OUTPATIENT)
Dept: NEUROLOGY | Facility: CLINIC | Age: 68
End: 2023-02-16
Payer: MEDICARE

## 2023-02-16 VITALS
DIASTOLIC BLOOD PRESSURE: 78 MMHG | SYSTOLIC BLOOD PRESSURE: 125 MMHG | HEART RATE: 71 BPM | BODY MASS INDEX: 27.46 KG/M2 | HEIGHT: 63 IN | WEIGHT: 155 LBS

## 2023-02-16 DIAGNOSIS — M54.50 LOW BACK PAIN, UNSPECIFIED: ICD-10-CM

## 2023-02-16 DIAGNOSIS — G89.29 LOW BACK PAIN, UNSPECIFIED: ICD-10-CM

## 2023-02-16 PROCEDURE — 99203 OFFICE O/P NEW LOW 30 MIN: CPT

## 2023-02-16 NOTE — HISTORY OF PRESENT ILLNESS
[FreeTextEntry1] : The patient is a 67-year-old right-handed woman with a history of diverticulosis and obesity, presenting for evaluation of low back pain.  She states that the pain began in about 2019.  It crosses over the entire low back and occasionally radiates to the right buttocks.  She has had a few transient episodes of left leg cramping and of paresthesias of the left leg.  She has chronic problems with urination following bladder surgery, but no acute changes.  There has been no change in her bowel habits.  She denies any focal weakness.  The pain is currently 0/10 while sitting.  Upon exertion, it can go to 6/10.  It is worse with prolonged standing and lifting.  It is somewhat relieved by nonsteroidal anti-inflammatory drugs and by physical therapy.  Of note, she worked for many years as a  in an [employer].  She brings with her the results of an MRI scan performed on January 18, 2023.  It showed some mild to moderate degenerative changes, but no clear neural impingement.

## 2023-02-16 NOTE — DATA REVIEWED
[de-identified] : I have reviewed the results of an MRI scan of the lumbar spine dated January 18, 2023.  It showed degenerative changes.  There was nothing significant.

## 2023-02-16 NOTE — DISCUSSION/SUMMARY
[FreeTextEntry1] : The patient is a 67-year-old right-handed woman with a history of diverticulosis and obesity, presenting for evaluation of low back pain.  She states that the pain began in about 2019.  It crosses over the entire low back and occasionally radiates to the right buttocks.  She has had a few transient episodes of left leg cramping and of paresthesias of the left leg.  She has chronic problems with urination following bladder surgery, but no acute changes.  There has been no change in her bowel habits.  She denies any focal weakness.  The pain is currently 0/10 while sitting.  Upon exertion, it can go to 6/10.  It is worse with prolonged standing and lifting.  It is somewhat relieved by nonsteroidal anti-inflammatory drugs and by physical therapy.  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  She brings with her the results of an MRI scan performed on January 18, 2023.  It showed some mild to moderate degenerative changes, but no clear neural impingement. \par This is almost certainly some mild degenerative change as a result of her long time standing as a .  She has no neurological deficit.  There is some mild hyperlordosis.  I have suggested that she continue in physical therapy and I have given her instructions for weight loss, aerobic exercise, and back stretching.

## 2023-02-16 NOTE — PHYSICAL EXAM
[General Appearance - Alert] : alert [General Appearance - In No Acute Distress] : in no acute distress [Oriented To Time, Place, And Person] : oriented to person, place, and time [Impaired Insight] : insight and judgment were intact [Affect] : the affect was normal [Person] : oriented to person [Place] : oriented to place [Time] : oriented to time [Concentration Intact] : normal concentrating ability [Visual Intact] : visual attention was ~T not ~L decreased [Naming Objects] : no difficulty naming common objects [Repeating Phrases] : no difficulty repeating a phrase [Writing A Sentence] : no difficulty writing a sentence [Fluency] : fluency intact [Comprehension] : comprehension intact [Reading] : reading intact [Past History] : adequate knowledge of personal past history [Cranial Nerves Optic (II)] : visual acuity intact bilaterally,  visual fields full to confrontation, pupils equal round and reactive to light [Cranial Nerves Oculomotor (III)] : extraocular motion intact [Cranial Nerves Trigeminal (V)] : facial sensation intact symmetrically [Cranial Nerves Facial (VII)] : face symmetrical [Cranial Nerves Vestibulocochlear (VIII)] : hearing was intact bilaterally [Cranial Nerves Glossopharyngeal (IX)] : tongue and palate midline [Cranial Nerves Accessory (XI - Cranial And Spinal)] : head turning and shoulder shrug symmetric [Cranial Nerves Hypoglossal (XII)] : there was no tongue deviation with protrusion [Motor Strength] : muscle strength was normal in all four extremities [No Muscle Atrophy] : normal bulk in all four extremities [Sensation Tactile Decrease] : light touch was intact [Balance] : balance was intact [2+] : Brachioradialis left 2+ [1+] : Ankle jerk left 1+ [Sclera] : the sclera and conjunctiva were normal [PERRL With Normal Accommodation] : pupils were equal in size, round, reactive to light, with normal accommodation [Extraocular Movements] : extraocular movements were intact [Outer Ear] : the ears and nose were normal in appearance [Oropharynx] : the oropharynx was normal [Neck Appearance] : the appearance of the neck was normal [Neck Cervical Mass (___cm)] : no neck mass was observed [Jugular Venous Distention Increased] : there was no jugular-venous distention [Thyroid Diffuse Enlargement] : the thyroid was not enlarged [Thyroid Nodule] : there were no palpable thyroid nodules [Auscultation Breath Sounds / Voice Sounds] : lungs were clear to auscultation bilaterally [Heart Rate And Rhythm] : heart rate was normal and rhythm regular [Heart Sounds] : normal S1 and S2 [Heart Sounds Gallop] : no gallops [Murmurs] : no murmurs [Heart Sounds Pericardial Friction Rub] : no pericardial rub [Full Pulse] : the pedal pulses are present [Edema] : there was no peripheral edema [No CVA Tenderness] : no ~M costovertebral angle tenderness [No Spinal Tenderness] : no spinal tenderness [Abnormal Walk] : normal gait [Nail Clubbing] : no clubbing  or cyanosis of the fingernails [Musculoskeletal - Swelling] : no joint swelling seen [Motor Tone] : muscle strength and tone were normal [Skin Color & Pigmentation] : normal skin color and pigmentation [Skin Turgor] : normal skin turgor [] : no rash [Past-pointing] : there was no past-pointing [Tremor] : no tremor present [Plantar Reflex Right Only] : normal on the right [Plantar Reflex Left Only] : normal on the left [FreeTextEntry1] : There is some accentuation of the lumbar lordosis.

## 2023-06-08 ENCOUNTER — APPOINTMENT (OUTPATIENT)
Dept: NEUROLOGY | Facility: CLINIC | Age: 68
End: 2023-06-08

## 2023-07-10 ENCOUNTER — APPOINTMENT (OUTPATIENT)
Dept: OBGYN | Facility: CLINIC | Age: 68
End: 2023-07-10
Payer: MEDICARE

## 2023-07-10 ENCOUNTER — APPOINTMENT (OUTPATIENT)
Dept: ANTEPARTUM | Facility: CLINIC | Age: 68
End: 2023-07-10
Payer: MEDICARE

## 2023-07-10 VITALS — SYSTOLIC BLOOD PRESSURE: 136 MMHG | DIASTOLIC BLOOD PRESSURE: 74 MMHG | WEIGHT: 156 LBS | BODY MASS INDEX: 27.63 KG/M2

## 2023-07-10 DIAGNOSIS — Z80.52 FAMILY HISTORY OF MALIGNANT NEOPLASM OF BLADDER: ICD-10-CM

## 2023-07-10 DIAGNOSIS — Z83.3 FAMILY HISTORY OF DIABETES MELLITUS: ICD-10-CM

## 2023-07-10 PROCEDURE — 76830 TRANSVAGINAL US NON-OB: CPT | Mod: 59

## 2023-07-10 PROCEDURE — 99204 OFFICE O/P NEW MOD 45 MIN: CPT

## 2023-07-10 PROCEDURE — 76857 US EXAM PELVIC LIMITED: CPT

## 2023-07-10 RX ORDER — DICYCLOMINE HYDROCHLORIDE 10 MG/1
10 CAPSULE ORAL
Qty: 360 | Refills: 1 | Status: DISCONTINUED | COMMUNITY
Start: 2022-03-14 | End: 2023-07-10

## 2023-07-10 RX ORDER — ALENDRONATE SODIUM 70 MG/1
70 TABLET ORAL
Qty: 12 | Refills: 0 | Status: DISCONTINUED | COMMUNITY
Start: 2020-12-14 | End: 2023-07-10

## 2023-07-10 RX ORDER — ESTRADIOL 0.1 MG/G
0.1 CREAM VAGINAL
Qty: 42 | Refills: 0 | Status: DISCONTINUED | COMMUNITY
Start: 2021-03-15 | End: 2023-07-10

## 2023-07-10 NOTE — REVIEW OF SYSTEMS
[Abdominal Pain] : abdominal pain [Abn Vaginal bleeding] : abnormal vaginal bleeding [Negative] : Constitutional [FreeTextEntry8] : vaginal bleeding

## 2023-07-10 NOTE — HISTORY OF PRESENT ILLNESS
[Patient reported PAP Smear was normal] : Patient reported PAP Smear was normal [Menopause Age: ____] : age at menopause was [unfilled] [Currently Active] : currently active [Men] : men [Vaginal] : vaginal [No] : No [FreeTextEntry1] : 68 yo female presents today stating that last week she had vaginal bleeding after using the bathroom when she wipes which lasted for week. She states that bleeding has since stopped. She was evaluated by her pcp who told her that she had a UTI. She is currently taking cephalexin 500mg. She has an appointment with urology tomorrow for further management. She c/o mild abdominal pressure pain and some back pain. She reports a history of tumors on her ovaries with a hysterectomy done "more than 20 years ago." [Mammogramdate] : 07/2023 [TextBox_19] : pt awaiting results [PapSmeardate] : 2021

## 2023-07-10 NOTE — PLAN
[FreeTextEntry1] : 66 yo female with vaginal bleeding:\par -pt to continue taking cephalexin 500mg until course is complete\par -gyn sono wnl; see official sono report\par - advised patient that hematuria can be common when someone has a uti. Patient to continue to monitor. If bleeding persists will return to office for pap smear\par -f/u prn

## 2023-10-24 ENCOUNTER — APPOINTMENT (OUTPATIENT)
Dept: OBGYN | Facility: CLINIC | Age: 68
End: 2023-10-24
Payer: MEDICARE

## 2023-10-24 VITALS
HEART RATE: 72 BPM | SYSTOLIC BLOOD PRESSURE: 145 MMHG | WEIGHT: 154 LBS | TEMPERATURE: 97.9 F | DIASTOLIC BLOOD PRESSURE: 81 MMHG | HEIGHT: 63 IN | BODY MASS INDEX: 27.29 KG/M2 | OXYGEN SATURATION: 98 %

## 2023-10-24 DIAGNOSIS — Z78.9 OTHER SPECIFIED HEALTH STATUS: ICD-10-CM

## 2023-10-24 DIAGNOSIS — Z90.711 ACQUIRED ABSENCE OF UTERUS WITH REMAINING CERVICAL STUMP: ICD-10-CM

## 2023-10-24 PROCEDURE — 99214 OFFICE O/P EST MOD 30 MIN: CPT

## 2023-10-26 ENCOUNTER — APPOINTMENT (OUTPATIENT)
Dept: GASTROENTEROLOGY | Facility: CLINIC | Age: 68
End: 2023-10-26
Payer: MEDICARE

## 2023-10-26 VITALS
WEIGHT: 160.25 LBS | BODY MASS INDEX: 28.39 KG/M2 | TEMPERATURE: 97 F | RESPIRATION RATE: 14 BRPM | HEIGHT: 63 IN | OXYGEN SATURATION: 96 % | SYSTOLIC BLOOD PRESSURE: 130 MMHG | HEART RATE: 77 BPM | DIASTOLIC BLOOD PRESSURE: 68 MMHG

## 2023-10-26 DIAGNOSIS — E66.9 OBESITY, UNSPECIFIED: ICD-10-CM

## 2023-10-26 PROCEDURE — 36415 COLL VENOUS BLD VENIPUNCTURE: CPT

## 2023-10-26 PROCEDURE — 99214 OFFICE O/P EST MOD 30 MIN: CPT | Mod: 25

## 2023-10-26 RX ORDER — DOXYCYCLINE HYCLATE 100 MG/1
100 CAPSULE ORAL
Qty: 20 | Refills: 1 | Status: ACTIVE | COMMUNITY
Start: 2023-10-26 | End: 1900-01-01

## 2023-10-27 LAB
ALBUMIN SERPL ELPH-MCNC: 4.2 G/DL
ALP BLD-CCNC: 95 U/L
ALT SERPL-CCNC: 17 U/L
ANION GAP SERPL CALC-SCNC: 13 MMOL/L
AST SERPL-CCNC: 18 U/L
BASOPHILS # BLD AUTO: 0.02 K/UL
BASOPHILS NFR BLD AUTO: 0.4 %
BILIRUB SERPL-MCNC: 0.2 MG/DL
BUN SERPL-MCNC: 18 MG/DL
CALCIUM SERPL-MCNC: 9.5 MG/DL
CHLORIDE SERPL-SCNC: 107 MMOL/L
CO2 SERPL-SCNC: 24 MMOL/L
CREAT SERPL-MCNC: 0.84 MG/DL
EGFR: 75 ML/MIN/1.73M2
EOSINOPHIL # BLD AUTO: 0.11 K/UL
EOSINOPHIL NFR BLD AUTO: 2.2 %
GLUCOSE SERPL-MCNC: 126 MG/DL
HCT VFR BLD CALC: 40.4 %
HGB BLD-MCNC: 12.9 G/DL
IMM GRANULOCYTES NFR BLD AUTO: 0.2 %
LYMPHOCYTES # BLD AUTO: 2.48 K/UL
LYMPHOCYTES NFR BLD AUTO: 49.9 %
MAN DIFF?: NORMAL
MCHC RBC-ENTMCNC: 30.8 PG
MCHC RBC-ENTMCNC: 31.9 GM/DL
MCV RBC AUTO: 96.4 FL
MONOCYTES # BLD AUTO: 0.35 K/UL
MONOCYTES NFR BLD AUTO: 7 %
NEUTROPHILS # BLD AUTO: 2 K/UL
NEUTROPHILS NFR BLD AUTO: 40.3 %
PLATELET # BLD AUTO: 308 K/UL
POTASSIUM SERPL-SCNC: 4.2 MMOL/L
PROT SERPL-MCNC: 6.5 G/DL
RBC # BLD: 4.19 M/UL
RBC # FLD: 14.1 %
SODIUM SERPL-SCNC: 144 MMOL/L
WBC # FLD AUTO: 4.97 K/UL

## 2023-11-01 ENCOUNTER — APPOINTMENT (OUTPATIENT)
Dept: UROGYNECOLOGY | Facility: CLINIC | Age: 68
End: 2023-11-01
Payer: MEDICARE

## 2023-11-01 VITALS
OXYGEN SATURATION: 96 % | HEIGHT: 63 IN | TEMPERATURE: 98 F | HEART RATE: 72 BPM | WEIGHT: 160 LBS | SYSTOLIC BLOOD PRESSURE: 123 MMHG | BODY MASS INDEX: 28.35 KG/M2 | DIASTOLIC BLOOD PRESSURE: 75 MMHG

## 2023-11-01 DIAGNOSIS — N39.3 STRESS INCONTINENCE (FEMALE) (MALE): ICD-10-CM

## 2023-11-01 DIAGNOSIS — R39.15 URGENCY OF URINATION: ICD-10-CM

## 2023-11-01 DIAGNOSIS — N81.11 CYSTOCELE, MIDLINE: ICD-10-CM

## 2023-11-01 DIAGNOSIS — N81.6 RECTOCELE: ICD-10-CM

## 2023-11-01 DIAGNOSIS — R32 UNSPECIFIED URINARY INCONTINENCE: ICD-10-CM

## 2023-11-01 DIAGNOSIS — N39.41 URGE INCONTINENCE: ICD-10-CM

## 2023-11-01 DIAGNOSIS — Z87.448 PERSONAL HISTORY OF OTHER DISEASES OF URINARY SYSTEM: ICD-10-CM

## 2023-11-01 DIAGNOSIS — N95.2 POSTMENOPAUSAL ATROPHIC VAGINITIS: ICD-10-CM

## 2023-11-01 DIAGNOSIS — R35.1 NOCTURIA: ICD-10-CM

## 2023-11-01 PROCEDURE — 51701 INSERT BLADDER CATHETER: CPT

## 2023-11-01 PROCEDURE — 99214 OFFICE O/P EST MOD 30 MIN: CPT | Mod: 25

## 2023-11-01 RX ORDER — ESTRADIOL 0.1 MG/G
0.1 CREAM VAGINAL
Qty: 1 | Refills: 3 | Status: ACTIVE | COMMUNITY
Start: 2023-11-01 | End: 1900-01-01

## 2023-12-04 ENCOUNTER — APPOINTMENT (OUTPATIENT)
Dept: PODIATRY | Facility: CLINIC | Age: 68
End: 2023-12-04
Payer: MEDICARE

## 2023-12-04 DIAGNOSIS — M79.675 PAIN IN RIGHT TOE(S): ICD-10-CM

## 2023-12-04 DIAGNOSIS — M79.674 PAIN IN RIGHT TOE(S): ICD-10-CM

## 2023-12-04 DIAGNOSIS — L30.9 DERMATITIS, UNSPECIFIED: ICD-10-CM

## 2023-12-04 PROCEDURE — 99203 OFFICE O/P NEW LOW 30 MIN: CPT | Mod: 25

## 2023-12-04 PROCEDURE — 11720 DEBRIDE NAIL 1-5: CPT

## 2023-12-04 RX ORDER — CLOTRIMAZOLE AND BETAMETHASONE DIPROPIONATE 10; .5 MG/G; MG/G
1-0.05 CREAM TOPICAL TWICE DAILY
Qty: 1 | Refills: 2 | Status: ACTIVE | COMMUNITY
Start: 2023-12-04 | End: 1900-01-01

## 2023-12-04 RX ORDER — CICLOPIROX OLAMINE 7.7 MG/ML
0.77 SUSPENSION TOPICAL TWICE DAILY
Qty: 1 | Refills: 2 | Status: ACTIVE | COMMUNITY
Start: 2023-12-04 | End: 1900-01-01

## 2024-03-29 ENCOUNTER — NON-APPOINTMENT (OUTPATIENT)
Age: 69
End: 2024-03-29

## 2024-04-03 ENCOUNTER — APPOINTMENT (OUTPATIENT)
Dept: UROGYNECOLOGY | Facility: CLINIC | Age: 69
End: 2024-04-03

## 2024-04-03 ENCOUNTER — APPOINTMENT (OUTPATIENT)
Dept: DERMATOLOGY | Facility: CLINIC | Age: 69
End: 2024-04-03
Payer: MEDICARE

## 2024-04-03 PROCEDURE — 99204 OFFICE O/P NEW MOD 45 MIN: CPT

## 2024-04-03 RX ORDER — FLUOCINONIDE 0.5 MG/ML
0.05 SOLUTION TOPICAL
Qty: 1 | Refills: 2 | Status: ACTIVE | COMMUNITY
Start: 2024-04-03 | End: 1900-01-01

## 2024-04-03 NOTE — ASSESSMENT
[FreeTextEntry1] : # androgenetic alopecia., possible recent TE but hair pull neg today New diagnosis with uncertain prognosis, flaring - education, counseling - TSH, vitamin D and ferritin today - minoxidil 5% liquids qHS. SED  # seb derm w/ itch - education, counseling - lidex solution PRN itch. SED

## 2024-04-03 NOTE — HISTORY OF PRESENT ILLNESS
[FreeTextEntry1] : np [de-identified] : 67 yo F here for  Hair loss and itchy scalp present since 10/2023. Itching just started 1 wk ago.

## 2024-04-04 LAB
25(OH)D3 SERPL-MCNC: 30 NG/ML
FERRITIN SERPL-MCNC: 96 NG/ML
TSH SERPL-ACNC: 1.95 UIU/ML

## 2024-04-26 ENCOUNTER — EMERGENCY (EMERGENCY)
Facility: HOSPITAL | Age: 69
LOS: 1 days | Discharge: HOME | End: 2024-04-26
Attending: EMERGENCY MEDICINE | Admitting: EMERGENCY MEDICINE
Payer: MEDICARE

## 2024-04-26 VITALS
TEMPERATURE: 98 F | OXYGEN SATURATION: 100 % | HEART RATE: 87 BPM | RESPIRATION RATE: 18 BRPM | SYSTOLIC BLOOD PRESSURE: 151 MMHG | DIASTOLIC BLOOD PRESSURE: 75 MMHG | HEIGHT: 60 IN | WEIGHT: 158.07 LBS

## 2024-04-26 DIAGNOSIS — O34.219 MATERNAL CARE FOR UNSPECIFIED TYPE SCAR FROM PREVIOUS CESAREAN DELIVERY: Chronic | ICD-10-CM

## 2024-04-26 PROCEDURE — 99284 EMERGENCY DEPT VISIT MOD MDM: CPT

## 2024-04-26 RX ORDER — IBUPROFEN 200 MG
600 TABLET ORAL ONCE
Refills: 0 | Status: COMPLETED | OUTPATIENT
Start: 2024-04-26 | End: 2024-04-26

## 2024-04-26 RX ORDER — LIDOCAINE 4 G/100G
1 CREAM TOPICAL ONCE
Refills: 0 | Status: COMPLETED | OUTPATIENT
Start: 2024-04-26 | End: 2024-04-26

## 2024-04-26 RX ADMIN — Medication 600 MILLIGRAM(S): at 08:31

## 2024-04-26 RX ADMIN — LIDOCAINE 1 PATCH: 4 CREAM TOPICAL at 08:31

## 2024-04-26 NOTE — ED PROVIDER NOTE - PATIENT PORTAL LINK FT
You can access the FollowMyHealth Patient Portal offered by [facility] by registering at the following website: [email or web address]. By joining [employer]’s FollowMyHealth portal, you will also be able to view your health information using other applications (apps) compatible with our system.

## 2024-04-26 NOTE — ED PROVIDER NOTE - DIFFERENTIAL DIAGNOSIS
Differential Diagnosis Muscle strain, contusion, low back pain, upper back pain, cervical radiculopathy.

## 2024-04-26 NOTE — ED PROVIDER NOTE - NSFOLLOWUPINSTRUCTIONS_ED_ALL_ED_FT
It was a pleasure caring for you today!    You were seen in the ER today for shoulder pain.     Please follow up with an orthopedic doctor and  your primary care doctor within 1 - 3 days. Call and let them know you were seen in the ER today.   Bring the results of your blood work and imaging with you to your appointment, if applicable.    For pain, please take acetaminophen 650 mg every 6 hours for pain. Additionally, you can also take ibuprofen 400 mg every 6-8 hours for pain.    Return to the ER for any worsening symptoms or concerns, including worsening/persistent shoulder pain, chest pain, shortness of breath, lightheadedness, weakness, or any other concerns.

## 2024-04-26 NOTE — ED ADULT TRIAGE NOTE - CHIEF COMPLAINT QUOTE
Pt arrives to ED c/o left shoulder, arm and back pain since Monday following lifting a heavy box.  Pt was seen at her PCP and given meds but pain is still present.  Pt llast took Cyclobenzaprine @18:00 yesterday and Tylenol @ 06:00 this morning.  Pt had XR but the report was not in.

## 2024-04-26 NOTE — ED PROVIDER NOTE - ATTENDING CONTRIBUTION TO CARE
I performed a history and physical exam of the patient and discussed their management with the resident and /or advanced care provider. I reviewed the resident and /or ACP's note and agree with the documented findings and plan of care. My medical decision making and observations are found above.  Lungs clear, abd soft, lt arm with ok passive motion,

## 2024-04-26 NOTE — ED PROVIDER NOTE - PHYSICAL EXAMINATION
Const: Awake, alert, no acute distress.  Well appearing.  Moving comfortably on stretcher.  HEENT: NC/AT.  Extraocular movements intact b/l.  Conjunctiva pink.  No scleral icterus.  Neck: Neck supple, full ROM without pain.  Cardiac: Regular rate and regular rhythm. S1 S2 present.  Peripheral pulses 2+ and symmetric. No LE edema.  Resp: Speaking in full sentences. No evidence of respiratory distress.  Breath sounds clear to auscultation b/l. Normal chest excursion.   Back: Spine midline and non-tender. No CVAT.  Skin: Normal coloration.  No rashes, abrasions or lacerations.  MSK: Active ROM in all extremities. Sensation intact. 5/5 strength in all extremities.   Neuro: Awake, alert & oriented x 3.  Moves all extremities spontaneously.  No focal deficits. Normal gait.

## 2024-04-26 NOTE — ED PROVIDER NOTE - CLINICAL SUMMARY MEDICAL DECISION MAKING FREE TEXT BOX
68-year-old female with no significant past medical history presents to the ED with left shoulder pain.  Patient states this pain started 6 days ago after lifting a heavy object.  Patient states she went to pediatrician who scheduled for an x-ray and started her on Tylenol and Flexeril.  Patient also bought a arm sling to stabilize her arm.  Patient endorses cholecystectomy and bladder surgery in the past.  Patient is allergic to Bactrim.  Noncontributory social history. Most likely musculoskeletal pain. Low concern for fracture or dislocation. Will switch patient to nsaid. Ortho follow up. 68-year-old female with no significant past medical history presents to the ED with left shoulder pain.  Patient states this pain started 6 days ago after lifting a heavy object.  Patient states she went to pediatrician who scheduled for an x-ray and started her on Tylenol and Flexeril.  Patient also bought a arm sling to stabilize her arm.  Patient endorses cholecystectomy and bladder surgery in the past.  Patient is allergic to Bactrim.  Noncontributory social history. Most likely musculoskeletal pain. Low concern for fracture or dislocation. Will switch patient to nsaid. Ortho follow up.    Juju: Patient is 68-year-old with pain which started at night.  Patient states that she had been lifting a bunch that day but the pain did not start when she was doing the lifting.  Patient with no direct trauma to the area.  Patient with no rashes.  Pain in low back has been a problem for her for a long period of time.  Patient now states for me that her pain is mostly over her scapula is not tender when I touch it although touching the spine in the back causes some irritation.  No CVA tenderness.  Patient with normal strength and sensation in her hands.  Likely all muscle pain or cervical radiculopathy.  Patiently currently taking muscle relaxants and Tylenol.  Would switch to NSAIDs and have patient have available orthopedic follow-up.  X-rays were done elsewhere and she does not know the results but because of the type of injury lack of trauma and exam would not think x-rays needed here.

## 2024-04-26 NOTE — ED PROVIDER NOTE - NSFOLLOWUPCLINICS_GEN_ALL_ED_FT
[facility] Orthopedic Surgery  Orthopedic Surgery  [address], 3rd & [address]th [place] [address]  Phone: [phone]  Fax:   Follow Up Time: 4-6 Days    [facility]  Orthopedic Surgery  [address] [place] [id number]  Phone: [phone]  Fax:   Follow Up Time: 4-6 Days

## 2024-04-26 NOTE — ED ADULT NURSE NOTE - OBJECTIVE STATEMENT
Pt received to intake 10a, awake and alert, A&OX4, ambulatory. C/o of left shoulder pain. pt lifted a heavy box on Monday and has pain ever since.  pt went to PCP but has heard nothing back. no bruising or swelling noted to the area.    Respirations even and unlabored. Denies CP, SOB, N/V, HA, dizziness, palpitations, blurry vision.  Bed in lowest position, . Safety maintained.

## 2024-04-26 NOTE — ED PROVIDER NOTE - NSPTACCESSSVCSAPPT_ED_ALL_ED
Specialty Care and/or PCP (routine)... PAST SURGICAL HISTORY:  No significant past surgical history

## 2024-04-26 NOTE — ED PROVIDER NOTE - OBJECTIVE STATEMENT
68-year-old female with no significant past medical history presents to the ED with left shoulder pain.  Patient states this pain started 6 days ago after lifting a heavy object.  Patient states she went to pediatrician who scheduled for an x-ray and started her on Tylenol and Flexeril.  Patient also bought a arm sling to stabilize her arm.  Patient endorses cholecystectomy and bladder surgery in the past.  Patient is allergic to Bactrim.  Noncontributory social history.

## 2024-04-28 ENCOUNTER — NON-APPOINTMENT (OUTPATIENT)
Age: 69
End: 2024-04-28

## 2024-04-29 RX ORDER — SOLIFENACIN SUCCINATE 5 MG/1
5 TABLET ORAL
Qty: 30 | Refills: 3 | Status: ACTIVE | COMMUNITY
Start: 2024-04-29 | End: 1900-01-01

## 2024-04-30 ENCOUNTER — APPOINTMENT (OUTPATIENT)
Dept: ORTHOPEDIC SURGERY | Facility: CLINIC | Age: 69
End: 2024-04-30
Payer: MEDICARE

## 2024-04-30 DIAGNOSIS — R07.81 PLEURODYNIA: ICD-10-CM

## 2024-04-30 PROCEDURE — 99205 OFFICE O/P NEW HI 60 MIN: CPT

## 2024-04-30 PROCEDURE — 73010 X-RAY EXAM OF SHOULDER BLADE: CPT | Mod: LT

## 2024-04-30 PROCEDURE — 72070 X-RAY EXAM THORAC SPINE 2VWS: CPT

## 2024-04-30 PROCEDURE — 71100 X-RAY EXAM RIBS UNI 2 VIEWS: CPT | Mod: LT

## 2024-04-30 RX ORDER — METHYLPREDNISOLONE 4 MG/1
4 TABLET ORAL
Qty: 1 | Refills: 0 | Status: ACTIVE | COMMUNITY
Start: 2024-04-30 | End: 1900-01-01

## 2024-04-30 NOTE — DISCUSSION/SUMMARY
[Medication Risks Reviewed] : Medication risks reviewed [Surgical risks reviewed] : Surgical risks reviewed [de-identified] : reviewed the case and the imaging with her likely cervical radiculopathy given the positinal findings (salute sign) and xrays findings of severe spondylosis C5-7  HAS severe degen changes on the xray  indicated for cervical MRi eval for nerve compressoin  MDP in the meantime gabapentin with precuastion  may be looking at acdf C5-7 - we discussed this today  fu to review tHE mrI   I have indicated the patient for an Anterior Cervical Discectomy & Fusion.   We've discussed the surgery details including instrumentation and grafting options (local, allograft, ICBG, and biologics) as well as potential for complications including but not limited to pain, scar, loss of function, permenant injury, death, need for additional surgery, need for blood transfuion, prolonged hospitilization, prolonged recovery, lack of recovery, blood clots, pulmonary embolism, heart attack, stroke, or  infection. There is also a possibility for hardware complication such as malposition of hardware,hardware loosening, pullout, failure or fracture of bone, adjacent segmen tdisease, pseudarthrosis, and need for future surgery. Finally, we discussed potential for injury to nerves, spinal cord or blood vessels, paralysis, blindness, need for transfusion, general anesthesia, allergic reaction, prolonged intubation,myocardial infarction, stroke, deep venous thrombosis, pulmonary embolus, and death. The patient understands these things and all questions are answered tohis/her satisfaction.  The surgery may need to be paused or staged or not completed due to intraoperative events or at the surgeon's discretion.  Any injury that occurs may be permenate.   Spinal fluid leak may occur and may require prolonged time in the hospital or further surgical procedures  Patient has been instructed to stop all Aspirin and NSAIDs 10 days prior to surgery date. For Coumadin and other blood thinners, the patient is referred to the medical doctor  We discussed we will use neuromonitioring for the surgery in order to possibly migitate risks of injury.  The procedure does not come with a guarantee of success or of satisfaction on the patient's behalf. At the surgeon's discretion he may call for assistance during the surgery or in the perioperative period   we used a

## 2024-04-30 NOTE — DATA REVIEWED
[Outside X-rays] : outside x-rays [Shoulder] : shoulder [Cervical Spine] : cervical spine [Report was reviewed and noted in the chart] : The report was reviewed and noted in the chart [I independently reviewed and interpreted images and report] : I independently reviewed and interpreted images and report

## 2024-04-30 NOTE — PHYSICAL EXAM
[Left] : left shoulder [FreeTextEntry3] : positive "salute sign" [de-identified] : weak in the left arm  [] : no tenderness to palpation

## 2024-04-30 NOTE — HISTORY OF PRESENT ILLNESS
[Mid-back] : mid-back [10] : 10 [8] : 8 [Stabbing] : stabbing [Tingling] : tingling [Constant] : constant [Household chores] : household chores [Sleep] : sleep [Nothing helps with pain getting better] : Nothing helps with pain getting better [Standing] : standing [Stairs] : stairs [de-identified] : 04/30/2024: 69 yo RHD - Patient is here for evaluation of left sided back, patient denies HAIR, but states on Sunday 04/21/2024 she lifted a bag of 15 pounds and felt uncomfortable after it, decided to go to [facility] on Tuesday 04/30, had x-rays, patient reports she uses an arm brace and OTC meds, apply ice and heating pouches for pain relief but nothing helps with pain. Tingling in the left arm gets relief holding arm up overhead  worse with sitting   Patient denies numbness but states she feel the pain radiates to her left arm and feels tingling.  pain worse with sitting better with laying down  No prior episodes of this   No PT/chiro/accupuncutre saw PCP and was given flexeril/acetaminophen didnt help  Tried ice/heat  healthy otherwise  retired   xrays reviewed: C spine - At C5-6 and C6-7, moderate disc narrowing and mild spondylosis are observed, moderately increasing in severity when compared with the prior study. Mild bilateral osseous neural foramen narrowing is identified at these levels. Reversal of the normal cervical curvature is again noted, a finding that may relate to spondylosis or muscle spasm. Mild to moderate multilevel facet arthropathy is observed. No prevertebral soft tissue edema is identified.  L shoulder - No fracture or posttraumatic deformity.  The glenohumeral joint is intact.  Mild osteoarthritis of acromioclavicular joint.  xrays today: T spine - spondylosis in the T spine, scoliosis in the TL spine noted  scapula - negative  L ribs - negative  for fracture [] : no [FreeTextEntry7] : left arm [de-identified] : 04/23/2024

## 2024-05-06 ENCOUNTER — APPOINTMENT (OUTPATIENT)
Dept: MRI IMAGING | Facility: CLINIC | Age: 69
End: 2024-05-06
Payer: MEDICARE

## 2024-05-06 PROCEDURE — 72141 MRI NECK SPINE W/O DYE: CPT

## 2024-05-09 PROBLEM — M54.12 CERVICAL RADICULOPATHY, ACUTE: Status: ACTIVE | Noted: 2024-04-30

## 2024-05-09 PROBLEM — M54.16 LEFT LUMBAR RADICULOPATHY: Status: ACTIVE | Noted: 2020-12-15

## 2024-05-13 ENCOUNTER — APPOINTMENT (OUTPATIENT)
Dept: PAIN MANAGEMENT | Facility: CLINIC | Age: 69
End: 2024-05-13
Payer: MEDICARE

## 2024-05-13 VITALS — WEIGHT: 160 LBS | BODY MASS INDEX: 28.35 KG/M2 | HEIGHT: 63 IN

## 2024-05-13 DIAGNOSIS — M54.16 RADICULOPATHY, LUMBAR REGION: ICD-10-CM

## 2024-05-13 DIAGNOSIS — M54.12 RADICULOPATHY, CERVICAL REGION: ICD-10-CM

## 2024-05-13 PROCEDURE — 99204 OFFICE O/P NEW MOD 45 MIN: CPT

## 2024-05-13 RX ORDER — GABAPENTIN 300 MG/1
300 CAPSULE ORAL
Qty: 60 | Refills: 2 | Status: ACTIVE | COMMUNITY
Start: 2024-04-30 | End: 1900-01-01

## 2024-05-13 NOTE — DATA REVIEWED
[MRI] : MRI [Cervical Spine] : cervical spine [Report was reviewed and noted in the chart] : The report was reviewed and noted in the chart [I independently reviewed and interpreted images and report] : I independently reviewed and interpreted images and report [FreeTextEntry1] : 5/6/2024: C4-C5: grade I anterolisthesis; disc bulge w/o central herniation C5-C6: disc bulge w/ central herniation and no central stenosis; luschka hypertrophy and facet arthrosis with inferior LEFT NF stenosis C6-C7: disc bulge w/ central herniation and no central stenosis; luschka hypertrophy and facet arthrosis with inferior LEFT NF stenosis

## 2024-05-13 NOTE — HISTORY OF PRESENT ILLNESS
[Upper back] : upper back [Lower back] : lower back [Left Arm] : left arm [8] : 8 [5] : 5 [Dull/Aching] : dull/aching [Sharp] : sharp [Stabbing] : stabbing [Tingling] : tingling [Intermittent] : intermittent [Household chores] : household chores [Leisure] : leisure [Sleep] : sleep [Meds] : meds [Sitting] : sitting [Standing] : standing [Walking] : walking [] : no [FreeTextEntry1] : L shoulder  [FreeTextEntry6] : numbness and tingling on L shoulder  [FreeTextEntry7] : L shoulder  [de-identified] : x-ray and MRI

## 2024-05-13 NOTE — DISCUSSION/SUMMARY
[de-identified] : KAREN BILLINGS is a 68 year-old woman presenting for a NPV for a history of chronic neck pain.  Prior treatment: Gabapentin 600mg qhs Acetaminophen prn  Plan: 1) MRI cervical spine images reviewed with the patient. 2) Initiate physical therapy - script provided 3) Refill gabapentin 600mg qhs; denies AEs 4) Continue acetaminophen prn 5) RTC 2 months

## 2024-06-06 ENCOUNTER — APPOINTMENT (OUTPATIENT)
Dept: PODIATRY | Facility: CLINIC | Age: 69
End: 2024-06-06
Payer: MEDICARE

## 2024-06-06 DIAGNOSIS — L60.0 INGROWING NAIL: ICD-10-CM

## 2024-06-06 DIAGNOSIS — B35.1 TINEA UNGUIUM: ICD-10-CM

## 2024-06-06 PROCEDURE — 11720 DEBRIDE NAIL 1-5: CPT

## 2024-06-10 PROBLEM — L60.0 INGROWN NAIL: Status: ACTIVE | Noted: 2024-06-10

## 2024-06-10 PROBLEM — B35.1 ONYCHOMYCOSIS: Status: ACTIVE | Noted: 2023-12-04

## 2024-06-11 NOTE — PHYSICAL EXAM
[1+] : left foot posterior tibialis 1+ [2+] : left foot dorsalis pedis 2+ [FreeTextEntry3] : CFT: 4 seconds x10 Positive hair growth.  Temperature gradient within normal limits bilateral. [de-identified] : There is a forefoot varus, fully compensated, left greater than right with a pes planus deformity. There is negative pain on ankle ROM, subtarsal joint and midtarsal joint ROM. [FreeTextEntry1] : Epicritic sensation and light touch are intact bilateral.

## 2024-06-11 NOTE — HISTORY OF PRESENT ILLNESS
[FreeTextEntry1] : Patient presents today for follow-up. She states she has pain in the right hallux nail. She had cut her nail herself with a pair of scissors. She states that she thinks she had cut it too low and it became extremely painful. She denies any redness or drainage from the area. She states that she had used a particular device in the past but was unable to find it so she used new scissors, and this may have caused the problem. She has some tenderness in the area. She has some tenderness while in shoe gear but denies any additional redness or drainage from the site.  She denies any changes in her medical history of medications.

## 2024-06-11 NOTE — ASSESSMENT
[FreeTextEntry1] : Impression: Onychomycosis. Ingrowing nail.  Treatment: Discussed findings and conditions with the patient.  Discussed pedal care. She is to avoid cutting the nail herself. Discussed recommendation of prepping the nail and removing the nail via distal slant-back.  With patient's consent, the nail was prepped and offending nail border was removed via distal slant-back. Upon examination there was a small IPK noted in the lateral nail fold as well, most likely due to the overlapping 2nd toe on the right hallux, which is causing additional pressure. The IPK was carefully curettaged and enucleated without incidence. Antibiotic ointment was applied to the area. She is to continue antibiotic ointment for approximately 2 to 3 days. Patient is to return as needed. Any pain, problems or concerns to the toe, any redness or change in appearance she is to contact the office immediately.

## 2024-06-20 ENCOUNTER — APPOINTMENT (OUTPATIENT)
Dept: GASTROENTEROLOGY | Facility: CLINIC | Age: 69
End: 2024-06-20
Payer: MEDICARE

## 2024-06-20 VITALS
RESPIRATION RATE: 14 BRPM | WEIGHT: 162 LBS | SYSTOLIC BLOOD PRESSURE: 156 MMHG | HEART RATE: 80 BPM | OXYGEN SATURATION: 99 % | HEIGHT: 63 IN | DIASTOLIC BLOOD PRESSURE: 80 MMHG | BODY MASS INDEX: 28.7 KG/M2

## 2024-06-20 DIAGNOSIS — R10.9 UNSPECIFIED ABDOMINAL PAIN: ICD-10-CM

## 2024-06-20 DIAGNOSIS — M50.20 OTHER CERVICAL DISC DISPLACEMENT, UNSPECIFIED CERVICAL REGION: ICD-10-CM

## 2024-06-20 DIAGNOSIS — K57.32 DIVERTICULITIS OF LARGE INTESTINE W/OUT PERFORATION OR ABSCESS W/OUT BLEEDING: ICD-10-CM

## 2024-06-20 PROCEDURE — 99213 OFFICE O/P EST LOW 20 MIN: CPT

## 2024-06-20 NOTE — PHYSICAL EXAM

## 2024-06-20 NOTE — REVIEW OF SYSTEMS
[As Noted in HPI] : as noted in HPI [Abdominal Pain] : abdominal pain [Negative] : Heme/Lymph [Fever] : no fever [Chills] : no chills [Constipation] : no constipation [Diarrhea] : no diarrhea

## 2024-06-20 NOTE — ASSESSMENT
[FreeTextEntry1] : 68-year-old female history of diverticulosis with 1 week of right lower quadrant left lower quadrant discomfort.  There is no fever or chills or change in bowel habits.  There is no nocturnal symptoms.  She has no dysuria.  There is no weight loss or anorexia.  CT scan in 2019 showed diverticulosis and her last colonoscopy and endoscopy were in 2018.  RTO 6/20/24- still with  llq discomfort radiating to right. no constipation, no brbpr.   IMP: 1. mild diverticulitis, resolved 2. hx of diverticulosis 3. cystocele  PLAN: 1.1 year  rto  2. high fiber diet.

## 2024-07-15 ENCOUNTER — APPOINTMENT (OUTPATIENT)
Dept: PAIN MANAGEMENT | Facility: CLINIC | Age: 69
End: 2024-07-15
Payer: MEDICARE

## 2024-07-15 VITALS — BODY MASS INDEX: 28.7 KG/M2 | WEIGHT: 162 LBS | HEIGHT: 63 IN

## 2024-07-15 DIAGNOSIS — M54.12 RADICULOPATHY, CERVICAL REGION: ICD-10-CM

## 2024-07-15 PROCEDURE — 99213 OFFICE O/P EST LOW 20 MIN: CPT

## 2024-10-28 ENCOUNTER — APPOINTMENT (OUTPATIENT)
Dept: PODIATRY | Facility: CLINIC | Age: 69
End: 2024-10-28
Payer: MEDICARE

## 2024-10-28 DIAGNOSIS — L85.1 ACQUIRED KERATOSIS [KERATODERMA] PALMARIS ET PLANTARIS: ICD-10-CM

## 2024-10-28 DIAGNOSIS — M79.674 PAIN IN RIGHT TOE(S): ICD-10-CM

## 2024-10-28 DIAGNOSIS — M79.675 PAIN IN RIGHT TOE(S): ICD-10-CM

## 2024-10-28 DIAGNOSIS — M20.40 OTHER HAMMER TOE(S) (ACQUIRED), UNSPECIFIED FOOT: ICD-10-CM

## 2024-10-28 DIAGNOSIS — B35.1 TINEA UNGUIUM: ICD-10-CM

## 2024-10-28 PROCEDURE — 99212 OFFICE O/P EST SF 10 MIN: CPT | Mod: 25

## 2024-10-28 PROCEDURE — 11720 DEBRIDE NAIL 1-5: CPT

## 2024-10-28 RX ORDER — EFINACONAZOLE 100 MG/ML
10 SOLUTION TOPICAL
Qty: 1 | Refills: 4 | Status: ACTIVE | COMMUNITY
Start: 2024-10-28 | End: 1900-01-01

## 2024-10-29 PROBLEM — M20.40 HAMMERTOE: Status: ACTIVE | Noted: 2024-10-29

## 2024-10-29 PROBLEM — L85.1 PLANTAR KERATOSIS, ACQUIRED: Status: ACTIVE | Noted: 2024-10-29

## 2024-10-29 RX ORDER — EFINACONAZOLE 100 MG/ML
10 SOLUTION TOPICAL
Qty: 1 | Refills: 4 | Status: ACTIVE | COMMUNITY
Start: 2024-10-29 | End: 1900-01-01

## 2025-01-13 ENCOUNTER — APPOINTMENT (OUTPATIENT)
Dept: ORTHOPEDIC SURGERY | Facility: CLINIC | Age: 70
End: 2025-01-13

## 2025-03-27 ENCOUNTER — APPOINTMENT (OUTPATIENT)
Dept: ORTHOPEDIC SURGERY | Facility: CLINIC | Age: 70
End: 2025-03-27

## 2025-03-27 DIAGNOSIS — M54.16 RADICULOPATHY, LUMBAR REGION: ICD-10-CM

## 2025-03-27 DIAGNOSIS — M43.10 SPONDYLOLISTHESIS, SITE UNSPECIFIED: ICD-10-CM

## 2025-03-27 RX ORDER — METHYLPREDNISOLONE 4 MG/1
4 TABLET ORAL
Qty: 1 | Refills: 0 | Status: ACTIVE | COMMUNITY
Start: 2025-03-27 | End: 1900-01-01

## 2025-04-07 ENCOUNTER — APPOINTMENT (OUTPATIENT)
Dept: MRI IMAGING | Facility: CLINIC | Age: 70
End: 2025-04-07
Payer: MEDICARE

## 2025-04-07 PROCEDURE — 72148 MRI LUMBAR SPINE W/O DYE: CPT

## 2025-04-17 ENCOUNTER — APPOINTMENT (OUTPATIENT)
Dept: ORTHOPEDIC SURGERY | Facility: CLINIC | Age: 70
End: 2025-04-17
Payer: MEDICARE

## 2025-04-17 PROBLEM — M41.50 DEGENERATIVE SCOLIOSIS IN ADULT PATIENT: Status: ACTIVE | Noted: 2025-04-17

## 2025-04-17 PROBLEM — M43.17 ACQUIRED SPONDYLOLISTHESIS OF LUMBOSACRAL REGION: Status: ACTIVE | Noted: 2025-04-17

## 2025-04-17 PROCEDURE — 99215 OFFICE O/P EST HI 40 MIN: CPT

## 2025-04-24 ENCOUNTER — APPOINTMENT (OUTPATIENT)
Dept: ORTHOPEDIC SURGERY | Facility: CLINIC | Age: 70
End: 2025-04-24

## 2025-04-24 ENCOUNTER — APPOINTMENT (OUTPATIENT)
Dept: PAIN MANAGEMENT | Facility: CLINIC | Age: 70
End: 2025-04-24
Payer: MEDICARE

## 2025-04-24 VITALS — WEIGHT: 158 LBS | BODY MASS INDEX: 35.54 KG/M2 | HEIGHT: 56 IN

## 2025-04-24 DIAGNOSIS — M54.50 LOW BACK PAIN, UNSPECIFIED: ICD-10-CM

## 2025-04-24 DIAGNOSIS — M41.50 OTHER SECONDARY SCOLIOSIS, SITE UNSPECIFIED: ICD-10-CM

## 2025-04-24 DIAGNOSIS — G89.29 LOW BACK PAIN, UNSPECIFIED: ICD-10-CM

## 2025-04-24 DIAGNOSIS — M54.12 RADICULOPATHY, CERVICAL REGION: ICD-10-CM

## 2025-04-24 DIAGNOSIS — M54.16 RADICULOPATHY, LUMBAR REGION: ICD-10-CM

## 2025-04-24 DIAGNOSIS — M43.17 SPONDYLOLISTHESIS, LUMBOSACRAL REGION: ICD-10-CM

## 2025-04-24 PROCEDURE — 99214 OFFICE O/P EST MOD 30 MIN: CPT

## 2025-05-13 ENCOUNTER — APPOINTMENT (OUTPATIENT)
Dept: PAIN MANAGEMENT | Facility: CLINIC | Age: 70
End: 2025-05-13
Payer: MEDICARE

## 2025-05-13 DIAGNOSIS — M54.16 RADICULOPATHY, LUMBAR REGION: ICD-10-CM

## 2025-05-13 PROCEDURE — 62323 NJX INTERLAMINAR LMBR/SAC: CPT

## 2025-05-29 ENCOUNTER — APPOINTMENT (OUTPATIENT)
Dept: PAIN MANAGEMENT | Facility: CLINIC | Age: 70
End: 2025-05-29
Payer: MEDICARE

## 2025-05-29 VITALS — WEIGHT: 158 LBS | HEIGHT: 56 IN | BODY MASS INDEX: 35.54 KG/M2

## 2025-05-29 DIAGNOSIS — M41.50 OTHER SECONDARY SCOLIOSIS, SITE UNSPECIFIED: ICD-10-CM

## 2025-05-29 DIAGNOSIS — M54.16 RADICULOPATHY, LUMBAR REGION: ICD-10-CM

## 2025-05-29 DIAGNOSIS — M25.551 PAIN IN RIGHT HIP: ICD-10-CM

## 2025-05-29 DIAGNOSIS — M46.1 SACROILIITIS, NOT ELSEWHERE CLASSIFIED: ICD-10-CM

## 2025-05-29 PROCEDURE — 99214 OFFICE O/P EST MOD 30 MIN: CPT

## 2025-06-13 ENCOUNTER — EMERGENCY (EMERGENCY)
Facility: HOSPITAL | Age: 70
LOS: 1 days | End: 2025-06-13
Attending: EMERGENCY MEDICINE | Admitting: EMERGENCY MEDICINE
Payer: MEDICARE

## 2025-06-13 VITALS
RESPIRATION RATE: 16 BRPM | SYSTOLIC BLOOD PRESSURE: 153 MMHG | DIASTOLIC BLOOD PRESSURE: 92 MMHG | HEART RATE: 76 BPM | OXYGEN SATURATION: 99 % | TEMPERATURE: 98 F

## 2025-06-13 VITALS
DIASTOLIC BLOOD PRESSURE: 66 MMHG | RESPIRATION RATE: 16 BRPM | OXYGEN SATURATION: 100 % | HEART RATE: 66 BPM | SYSTOLIC BLOOD PRESSURE: 149 MMHG | TEMPERATURE: 98 F

## 2025-06-13 DIAGNOSIS — O34.219 MATERNAL CARE FOR UNSPECIFIED TYPE SCAR FROM PREVIOUS CESAREAN DELIVERY: Chronic | ICD-10-CM

## 2025-06-13 LAB
ALBUMIN SERPL ELPH-MCNC: 4.2 G/DL — SIGNIFICANT CHANGE UP (ref 3.3–5)
ALP SERPL-CCNC: 100 U/L — SIGNIFICANT CHANGE UP (ref 40–120)
ALT FLD-CCNC: 23 U/L — SIGNIFICANT CHANGE UP (ref 4–33)
ANION GAP SERPL CALC-SCNC: 11 MMOL/L — SIGNIFICANT CHANGE UP (ref 7–14)
APPEARANCE UR: CLEAR — SIGNIFICANT CHANGE UP
AST SERPL-CCNC: 23 U/L — SIGNIFICANT CHANGE UP (ref 4–32)
BASOPHILS # BLD AUTO: 0.03 K/UL — SIGNIFICANT CHANGE UP (ref 0–0.2)
BASOPHILS NFR BLD AUTO: 0.6 % — SIGNIFICANT CHANGE UP (ref 0–2)
BILIRUB SERPL-MCNC: <0.2 MG/DL — SIGNIFICANT CHANGE UP (ref 0.2–1.2)
BILIRUB UR-MCNC: NEGATIVE — SIGNIFICANT CHANGE UP
BUN SERPL-MCNC: 16 MG/DL — SIGNIFICANT CHANGE UP (ref 7–23)
CALCIUM SERPL-MCNC: 9.7 MG/DL — SIGNIFICANT CHANGE UP (ref 8.4–10.5)
CHLORIDE SERPL-SCNC: 108 MMOL/L — HIGH (ref 98–107)
CO2 SERPL-SCNC: 24 MMOL/L — SIGNIFICANT CHANGE UP (ref 22–31)
COLOR SPEC: YELLOW — SIGNIFICANT CHANGE UP
CREAT SERPL-MCNC: 0.81 MG/DL — SIGNIFICANT CHANGE UP (ref 0.5–1.3)
DIFF PNL FLD: NEGATIVE — SIGNIFICANT CHANGE UP
EGFR: 78 ML/MIN/1.73M2 — SIGNIFICANT CHANGE UP
EGFR: 78 ML/MIN/1.73M2 — SIGNIFICANT CHANGE UP
EOSINOPHIL # BLD AUTO: 0.11 K/UL — SIGNIFICANT CHANGE UP (ref 0–0.5)
EOSINOPHIL NFR BLD AUTO: 2.2 % — SIGNIFICANT CHANGE UP (ref 0–6)
GLUCOSE SERPL-MCNC: 102 MG/DL — HIGH (ref 70–99)
GLUCOSE UR QL: NEGATIVE MG/DL — SIGNIFICANT CHANGE UP
HCT VFR BLD CALC: 42.5 % — SIGNIFICANT CHANGE UP (ref 34.5–45)
HGB BLD-MCNC: 13.7 G/DL — SIGNIFICANT CHANGE UP (ref 11.5–15.5)
IANC: 1.94 K/UL — SIGNIFICANT CHANGE UP (ref 1.8–7.4)
IMM GRANULOCYTES NFR BLD AUTO: 0.2 % — SIGNIFICANT CHANGE UP (ref 0–0.9)
KETONES UR QL: ABNORMAL MG/DL
LEUKOCYTE ESTERASE UR-ACNC: NEGATIVE — SIGNIFICANT CHANGE UP
LIDOCAIN IGE QN: 49 U/L — SIGNIFICANT CHANGE UP (ref 7–60)
LYMPHOCYTES # BLD AUTO: 2.42 K/UL — SIGNIFICANT CHANGE UP (ref 1–3.3)
LYMPHOCYTES # BLD AUTO: 49.2 % — HIGH (ref 13–44)
MAGNESIUM SERPL-MCNC: 2.1 MG/DL — SIGNIFICANT CHANGE UP (ref 1.6–2.6)
MCHC RBC-ENTMCNC: 30.2 PG — SIGNIFICANT CHANGE UP (ref 27–34)
MCHC RBC-ENTMCNC: 32.2 G/DL — SIGNIFICANT CHANGE UP (ref 32–36)
MCV RBC AUTO: 93.8 FL — SIGNIFICANT CHANGE UP (ref 80–100)
MONOCYTES # BLD AUTO: 0.41 K/UL — SIGNIFICANT CHANGE UP (ref 0–0.9)
MONOCYTES NFR BLD AUTO: 8.3 % — SIGNIFICANT CHANGE UP (ref 2–14)
NEUTROPHILS # BLD AUTO: 1.94 K/UL — SIGNIFICANT CHANGE UP (ref 1.8–7.4)
NEUTROPHILS NFR BLD AUTO: 39.5 % — LOW (ref 43–77)
NITRITE UR-MCNC: NEGATIVE — SIGNIFICANT CHANGE UP
NRBC # BLD AUTO: 0 K/UL — SIGNIFICANT CHANGE UP (ref 0–0)
NRBC # FLD: 0 K/UL — SIGNIFICANT CHANGE UP (ref 0–0)
NRBC BLD AUTO-RTO: 0 /100 WBCS — SIGNIFICANT CHANGE UP (ref 0–0)
PH UR: 5.5 — SIGNIFICANT CHANGE UP (ref 5–8)
PHOSPHATE SERPL-MCNC: 3.7 MG/DL — SIGNIFICANT CHANGE UP (ref 2.5–4.5)
PLATELET # BLD AUTO: 291 K/UL — SIGNIFICANT CHANGE UP (ref 150–400)
POTASSIUM SERPL-MCNC: 4.5 MMOL/L — SIGNIFICANT CHANGE UP (ref 3.5–5.3)
POTASSIUM SERPL-SCNC: 4.5 MMOL/L — SIGNIFICANT CHANGE UP (ref 3.5–5.3)
PROT SERPL-MCNC: 7.1 G/DL — SIGNIFICANT CHANGE UP (ref 6–8.3)
PROT UR-MCNC: NEGATIVE MG/DL — SIGNIFICANT CHANGE UP
RBC # BLD: 4.53 M/UL — SIGNIFICANT CHANGE UP (ref 3.8–5.2)
RBC # FLD: 14.1 % — SIGNIFICANT CHANGE UP (ref 10.3–14.5)
SODIUM SERPL-SCNC: 143 MMOL/L — SIGNIFICANT CHANGE UP (ref 135–145)
SP GR SPEC: 1.02 — SIGNIFICANT CHANGE UP (ref 1–1.03)
TROPONIN T, HIGH SENSITIVITY RESULT: 8 NG/L — SIGNIFICANT CHANGE UP
UROBILINOGEN FLD QL: 0.2 MG/DL — SIGNIFICANT CHANGE UP (ref 0.2–1)
WBC # BLD: 4.92 K/UL — SIGNIFICANT CHANGE UP (ref 3.8–10.5)
WBC # FLD AUTO: 4.92 K/UL — SIGNIFICANT CHANGE UP (ref 3.8–10.5)

## 2025-06-13 PROCEDURE — 93010 ELECTROCARDIOGRAM REPORT: CPT

## 2025-06-13 PROCEDURE — 99285 EMERGENCY DEPT VISIT HI MDM: CPT

## 2025-06-13 PROCEDURE — 70496 CT ANGIOGRAPHY HEAD: CPT | Mod: 26

## 2025-06-13 PROCEDURE — 70498 CT ANGIOGRAPHY NECK: CPT | Mod: 26

## 2025-06-13 RX ORDER — MECLIZINE HCL 12.5 MG
50 TABLET ORAL ONCE
Refills: 0 | Status: COMPLETED | OUTPATIENT
Start: 2025-06-13 | End: 2025-06-13

## 2025-06-13 RX ORDER — METOCLOPRAMIDE HCL 10 MG
10 TABLET ORAL ONCE
Refills: 0 | Status: COMPLETED | OUTPATIENT
Start: 2025-06-13 | End: 2025-06-13

## 2025-06-13 RX ADMIN — Medication 50 MILLIGRAM(S): at 18:35

## 2025-06-13 RX ADMIN — Medication 1000 MILLILITER(S): at 18:36

## 2025-06-13 RX ADMIN — Medication 10 MILLIGRAM(S): at 18:35

## 2025-06-13 NOTE — ED PROVIDER NOTE - ATTENDING WITH...
• FOR REFILL REQUESTS INCLUDING CONTROLLED SUBSTANCES   Please contact your pharmacy at least three (3) business days before your medication runs out.   The pharmacy will then send us a request for your refill.  Please allow 24-48 hours for the refill to be processed.   ?  • FOR CONTROLLED SUBSTANCE REFILL REQUESTS   Please call the clinic Monday through Friday, from 8:00am - 5:00pm to speak with a Patient .  ?  LAB AND X-RAY HOURS FOR [address] [place] [address]  Monday - Friday 7 am - 4:30 pm  ?  TEST RESULTS  If your physician has ordered additional laboratory or radiology testing as part of your ongoing plan of care, notification of the test results will be communicated in a timely manner once reviewed by your provider.   - If your results are normal, you will receive a letter in the mail.   - If there are any irregularities, you will receive a phone call from our office within 5 - 7 business days.   - If your results are critical and require more immediate intervention, you will be contacted promptly.   ?  YOUR OPINION MATTERS  You may be receiving a patient satisfaction survey in the mail. We would appreciate it if you could please take the time to complete, as your feedback is very important to us. We strive to make your experience exceptional and your comments help us with that goal. We look forward to hearing from you. Feedback is anonymous unless you choose otherwise.      
Resident

## 2025-06-13 NOTE — ED ADULT NURSE NOTE - CHIEF COMPLAINT QUOTE
Pt Latvian speaking ID# [id number] c/o dizziness x 3 days, worse when turning head. endorsing nausea, and blurry vision in rt eye 1 week.  denies chest pain ,fever, chills. pmhx: vertigo

## 2025-06-13 NOTE — CONSULT NOTE ADULT - SUBJECTIVE AND OBJECTIVE BOX
[facility] DEPARTMENT OF OPHTHALMOLOGY - INITIAL ADULT CONSULT  ----------------------------------------------------------------------------------------------------  Pari Russo MD PGY-1  Available on teams  ----------------------------------------------------------------------------------------------------    HPI:    69F h/o vertigo, meningioma s/p removal in 2018 ([facility]), presents with 3 days of dizziness described as room spinning, similar to previous vertigo that she had years ago.  She also c/o mild posterior headache, and "blurry vision" to right eye.  +Associated nausea.  Denies fever/chills, cp, sob, v/d, double vision, pain to eyes.  Dizziness worse when turning her head.    Interval History:     Ophtho consulted for blurry vision OD x1 week. Pt reports it's not worsening. +blurry vision, no flashes, floaters, FBS, erythema, discharge, or double vision, OU    PAST MEDICAL & SURGICAL HISTORY:  Diverticulitis      Delivery with history of  section        Past Ocular History: CE with PCIOL OU   Ophthalmic Medications: artificial tears x1 daily OU   FAMILY HISTORY:  No pertinent family history in first degree relatives      Social History: none     MEDICATIONS  (STANDING):    MEDICATIONS  (PRN):    Allergies & Intolerances:   Bactrim (Hives)    Review of Systems:  Constitutional: No fever, chills  Eyes: as above   Neuro: No tremors  Cardiovascular: No chest pain, palpitations  Respiratory: No SOB, no cough  GI: No nausea, vomiting, abdominal pain  : No dysuria  Skin: no rash  Psych: no depression  Endocrine: no polyuria, polydipsia  Heme/lymph: no swelling    VITALS: T(C): 36.6 (25 @ 18:58)  T(F): 97.8 (25 @ 18:58), Max: 98.1 (25 @ 16:37)  HR: 66 (25 @ 18:58) (66 - 76)  BP: 149/66 (25 @ 18:58) (149/66 - 153/92)  RR:  (16 - 16)  SpO2:  (99% - 100%)  Wt(kg): --  General: AAO x 3, appropriate mood and affect    Ophthalmology Exam:  Visual acuity (sc): 20/25 OD, 20/25 OS  Pupils: PERRL OU, no APD  Ttono: 18 OD, 14 OS  Extraocular movements (EOMs): Full OU, no pain, no diplopia  Confrontational Visual Field (CVF): Full OU  Color Plates: 12/12 OD, 12/12 OS    Pen Light Exam (PLE)  External: Flat OU  Lids/Lashes/Lacrimal Ducts: Flat OU    Sclera/Conjunctiva: W+Q OU  Cornea: DTBUT OU  Anterior Chamber: D+F OU    Iris: Flat OU  Lens: PCIOL OU    Fundus Exam: dilated with 1% tropicamide and 2.5% phenylephrine  Approval obtained from primary team for dilation  Patient aware that pupils can remained dilated for at least 4-6 hours  Exam performed with 20D lens    Vitreous: wnl OU  Disc, cup/disc: sharp and pink, 0.4 OU  Macula: wnl OU  Vessels: wnl OU  Periphery: wnl OU    Labs/Imaging:  LABS:                         13.7   4.92  )-----------( 291      ( 2025 18:20 )             42.5     06-13    143  |  108[H]  |  16  ----------------------------<  102[H]  4.5   |  24  |  0.81    Ca    9.7      2025 18:20  Phos  3.7     06-  Mg     2.10     06-    TPro  7.1  /  Alb  4.2  /  TBili  <0.2  /  DBili  x   /  AST  23  /  ALT  23  /  AlkPhos  100  06-      Urinalysis Basic - ( 2025 19:02 )    Color: Yellow / Appearance: Clear / S.020 / pH: x  Gluc: x / Ketone: x  / Bili: Negative / Urobili: 0.2 mg/dL   Blood: x / Protein: Negative mg/dL / Nitrite: Negative   Leuk Esterase: Negative / RBC: x / WBC x   Sq Epi: x / Non Sq Epi: x / Bacteria: x                RADIOLOGY, EKG & ADDITIONAL TESTS:    [facility] DEPARTMENT OF OPHTHALMOLOGY - INITIAL ADULT CONSULT  ----------------------------------------------------------------------------------------------------  Pari Russo MD PGY-1  Available on teams  ----------------------------------------------------------------------------------------------------    HPI:    69F h/o vertigo, meningioma s/p removal in 2018 ([facility]), presents with 3 days of dizziness described as room spinning, similar to previous vertigo that she had years ago.  She also c/o mild posterior headache, and "blurry vision" to right eye.  +Associated nausea.  Denies fever/chills, cp, sob, v/d, double vision, pain to eyes.  Dizziness worse when turning her head.    Interval History:     Ophtho consulted for blurry vision OD x1 week. Pt reports it's not worsening. +blurry vision, no flashes, floaters, FBS, erythema, discharge, or double vision, OU    PAST MEDICAL & SURGICAL HISTORY:  Diverticulitis      Delivery with history of  section        Past Ocular History: CE with PCIOL OU   Ophthalmic Medications: artificial tears x1 daily OU   FAMILY HISTORY:  No pertinent family history in first degree relatives      Social History: none     MEDICATIONS  (STANDING):    MEDICATIONS  (PRN):    Allergies & Intolerances:   Bactrim (Hives)    Review of Systems:  Constitutional: No fever, chills  Eyes: as above   Neuro: No tremors  Cardiovascular: No chest pain, palpitations  Respiratory: No SOB, no cough  GI: No nausea, vomiting, abdominal pain  : No dysuria  Skin: no rash  Psych: no depression  Endocrine: no polyuria, polydipsia  Heme/lymph: no swelling    VITALS: T(C): 36.6 (25 @ 18:58)  T(F): 97.8 (25 @ 18:58), Max: 98.1 (25 @ 16:37)  HR: 66 (25 @ 18:58) (66 - 76)  BP: 149/66 (25 @ 18:58) (149/66 - 153/92)  RR:  (16 - 16)  SpO2:  (99% - 100%)  Wt(kg): --  General: AAO x 3, appropriate mood and affect    Ophthalmology Exam:  Visual acuity (sc): 20/25 OD, 20/25 OS  Pupils: PERRL OU, no APD  Ttono: 18 OD, 14 OS  Extraocular movements (EOMs): Full OU, no pain, no diplopia  Confrontational Visual Field (CVF): Full OU  Color Plates: 12/12 OD, 12/12 OS    Pen Light Exam (PLE)  External: Flat OU  Lids/Lashes/Lacrimal Ducts: Flat OU    Sclera/Conjunctiva: W+Q OU  Cornea: DTBUT OU  Anterior Chamber: D+F OU    Iris: Flat OU  Lens: PCIOL OU    Fundus Exam: dilated with 1% tropicamide and 2.5% phenylephrine  Approval obtained from primary team for dilation  Patient aware that pupils can remained dilated for at least 4-6 hours  Exam performed with 20D lens    Vitreous: asteroid hyalosis OD, wnl OS  Disc, cup/disc: sharp and pink, 0.8 OU  Macula: wnl OU  Vessels: wnl OU  Periphery: wnl OU    Labs/Imaging:  LABS:                         13.7   4.92  )-----------( 291      ( 2025 18:20 )             42.5     06-13    143  |  108[H]  |  16  ----------------------------<  102[H]  4.5   |  24  |  0.81    Ca    9.7      2025 18:20  Phos  3.7     06-13  Mg     2.10     06-    TPro  7.1  /  Alb  4.2  /  TBili  <0.2  /  DBili  x   /  AST  23  /  ALT  23  /  AlkPhos  100  06-13      Urinalysis Basic - ( 2025 19:02 )    Color: Yellow / Appearance: Clear / S.020 / pH: x  Gluc: x / Ketone: x  / Bili: Negative / Urobili: 0.2 mg/dL   Blood: x / Protein: Negative mg/dL / Nitrite: Negative   Leuk Esterase: Negative / RBC: x / WBC x   Sq Epi: x / Non Sq Epi: x / Bacteria: x                RADIOLOGY, EKG & ADDITIONAL TESTS:

## 2025-06-13 NOTE — ED PROVIDER NOTE - NSFOLLOWUPINSTRUCTIONS_ED_ALL_ED_FT
Dixon imagenología por TC y mary análisis de manuela no mostraron nada anormal denys causa de mary síntomas. Es probable que tenga franny condición llamada vértigo posicional paroxístico giulia.     Hemos impreso información sobre cómo realizar la maniobra de Epley, también puede encontrar videos en YouTube. Por favor, tome meclizina según sea necesario.La oftalmología examinó dixon yudi y creen que mary síntomas se deben a ojos secos, por favor, obtenga algunas lágrimas artificiales de venta tylor, puede usarlas de 4 a 6 veces al día para ayudar con la visión borrosa.    Regrese a la ananya de emergencias si desarrolla nuevos síntomas preocupantes denys dolor de jessica severo, náuseas y vómitos hasta el punto de no poder tolerar líquidos, empeoramiento de la visión borrosa, visión doble, pérdida de visión en el ydui, dolor en el pecho o dificultad para respirar.    Your CT imaging and your blood work did not show anything abnormal as a cause of your symptoms.  You likely have a condition called benign paroxysmal positional vertigo.  We have printed out some information on how to perform the Epley maneuver, you can also find videos on YouTube.  Please take meclizine as needed.    The ophthalmology saw your eye and they believe that your symptoms are due to dry eye, please get some over-the-counter artificial tears you can use them 4-6 times daily to help with the blurriness.    Please return to the ER if you develop any new or concerning symptoms such as severe headache, nausea vomiting to the point you cannot tolerate liquids, worsening blurry vision, double vision, loss of vision to the eye, chest pain or shortness of breath.

## 2025-06-13 NOTE — ED PROVIDER NOTE - PROGRESS NOTE DETAILS
Bryce GILL, EM/IM PGY-4: Labs and imaging nonactionable, patient feels improved, ophthalmology reports she thinks that the unilateral blurry vision is due to dry eye.  Recommends artificial tears 4-6 times daily. safe for DC with meclizine and eye drops, pcp follow up.

## 2025-06-13 NOTE — ED PROVIDER NOTE - CLINICAL SUMMARY MEDICAL DECISION MAKING FREE TEXT BOX
Bryce GILL, EM/IM PGY-4: 69-year-old female past medical history vertigo, meningioma status postcraniotomy in 2018, presenting with 1 week of right eye blurriness and 3 days of nausea and vertiginous symptoms.  She mainly notices the vertigo nausea when she lies flat from sitting or when she sits back up from lying flat.  She denies any weakness in her arms or legs, loss of vision in either eye.  She does report that over the last week the right eye has been blurrier, she typically uses no corrective lenses or contacts, has has had cataract surgery but otherwise no known eye problems.  No pain in the eye or tearing.  No recent illness, no fever chills vomiting diarrhea abdominal pain chest pain shortness of breath.    Vital signs on arrival significant for mild hypertension 149/66 otherwise within normal limits, on physical exam patient is awake alert and oriented x 4, the vertigo is not triggered by head movements while sitting upward, she has no nystagmus on exam EOMI, pupillary response appropriate bilaterally and symmetric no APD.  Visual acuity OD (affected eye) 20/100 IOP OD 13, visual acuity OS 2025 IOP OS 13.5.  Right eye is not red or tearing.  At this time we will get a CT angiogram of the head and neck to rule intracranial abnormality, subacute stroke, recurrence of meningioma as a cause of her symptoms, patient likely has BPPV, less likely acute vestibular neuritis due to the mildness of the symptoms, blurry vision could be dry versus less likely glaucoma versus macular edema versus retinal hemorrhage.  Will call ophthalmology to see the patient, will give meclizine Reglan and 1 L of normal saline, will get a CBC CMP lipase magnesium phosphorus troponin urinalysis with reflex culture.

## 2025-06-13 NOTE — ED ADULT NURSE NOTE - OBJECTIVE STATEMENT
Aryan RN- Received pt in spot 5A. pt A&OX3, ambulatory with a cane. pt with hx of vertigo. pt c/o dizziness x 3 days worsening, states worse when turns her head. also reports nausea and blurry vision in the right eye x 1 week. pt appears to be in no distress. respirations are equal and nonlabored, no respiratory distress noted. denies any chest pain, sob, fever/chills. 20 gauge IV placed in the right AC, labs sent. pt medicated as per orders. stable, safety maintained, side rails up. report given to primary RN Kami for further plan.

## 2025-06-13 NOTE — ED ADULT NURSE NOTE - NSFALLRISKINTERV_ED_ALL_ED

## 2025-06-13 NOTE — ED ADULT TRIAGE NOTE - CHIEF COMPLAINT QUOTE
Pt Kinyarwanda speaking ID# [id number] c/o dizziness x 3 days, worse when turning head. endorsing nausea, and blurry vision in rt eye 1 week.  denies chest pain ,fever, chills. pmhx: vertigo

## 2025-06-13 NOTE — ED PROVIDER NOTE - ATTENDING CONTRIBUTION TO CARE
Dr. Cox:  I have personally performed a face to face bedside history and physical examination of this patient. I have discussed the history, examination, review of systems, assessment and plan of management with the resident. I have reviewed the electronic medical record and amended it to reflect my history, review of systems, physical exam, assessment and plan.    69F h/o vertigo, meningioma s/p removal in 2018 ([facility]), presents with 3 days of dizziness described as room spinning, similar to previous vertigo that she had years ago.  She also c/o mild posterior headache, and "blurry vision" to right eye.  +Associated nausea.  Denies fever/chills, cp, sob, v/d, double vision, pain to eyes.  Dizziness worse when turning her head.    Exam:  - nad  - rrr  - ctab   -abd soft ntnd  - no focal neuro deficits on exam, finger to nose intact, normal gait, strength equal bilaterally    A/P  = dizziness consistent with vertigo, eval central cause  - basic labs, CTA head/neck

## 2025-06-13 NOTE — CONSULT NOTE ADULT - ASSESSMENT
69y female with a past medical history of vertigo, meningioma s/p removal in 2018 and ocular history of cataract extraction with PCIOL OU consulted for blurry vision in the right eye, found to have dry eyes syndrome     #Dry eyes syndrome, both eyes  - presented to the ED with blurry vision OD x1 week. Reports improvement with proparacaine.  - On exam, BCVA 20/25 OU, no APD, IOP wnl OU, CVF Full OU, EOM full OU, Color plates 12/12 OU.  - Anterior exam with fluorescence with decreased tear break up time, suggestive of dry eyes.    - Fundus exam wnl OU   - Recommend artificial tears 4-6x/day OU  - No ophthalmology intervention at this time     Seen and discussed with Dr. Hendrickson    Outpatient Follow-up: Patient should follow-up with his/her ophthalmologist or with [facility] Department of Ophthalmology within 1 week of after discharge at:    [address]  [place] [address] [phone]    Pari Russo MD, PGY-1  Also available on [employer] Teams    INCOMPLETE NOTE      69y female with a past medical history of vertigo, meningioma s/p removal in 2018 and ocular history of cataract extraction with PCIOL OU consulted for blurry vision in the right eye, found to have dry eyes syndrome     #Dry eyes syndrome, both eyes  - presented to the ED with blurry vision OD x1 week. Reports improvement with proparacaine.  - On exam, BCVA 20/25 OU, no APD, IOP wnl OU, CVF Full OU, EOM full OU, Color plates 12/12 OU.  - Anterior exam with fluorescence with decreased tear break up time, suggestive of dry eyes.    - Fundus exam wnl OU   - Recommend artificial tears 4-6x/day OU  - No ophthalmology intervention at this time     Seen and discussed with Dr. Hendrickson    Outpatient Follow-up: Patient should follow-up with his/her ophthalmologist or with [facility] Department of Ophthalmology within 1 week of after discharge at:    [address]  [place] [address] [phone]    Pari Russo MD, PGY-1  Also available on [employer] Teams

## 2025-06-13 NOTE — ED PROVIDER NOTE - PATIENT PORTAL LINK FT
You can access the FollowMyHealth Patient Portal offered by [facility] by registering at the following website: [email or web address]. By joining [employer]’s FollowMyHealth portal, you will also be able to view your health information using other applications (apps) compatible with our system.

## 2025-06-24 ENCOUNTER — APPOINTMENT (OUTPATIENT)
Dept: PAIN MANAGEMENT | Facility: CLINIC | Age: 70
End: 2025-06-24
Payer: MEDICARE

## 2025-06-24 PROCEDURE — J3490M: CUSTOM

## 2025-06-24 PROCEDURE — 27096 INJECT SACROILIAC JOINT: CPT | Mod: RT

## 2025-07-07 ENCOUNTER — APPOINTMENT (OUTPATIENT)
Dept: PAIN MANAGEMENT | Facility: CLINIC | Age: 70
End: 2025-07-07
Payer: MEDICARE

## 2025-07-07 VITALS — WEIGHT: 158 LBS | HEIGHT: 56 IN | BODY MASS INDEX: 35.54 KG/M2

## 2025-07-07 PROBLEM — M54.16 LUMBAR RADICULITIS: Status: ACTIVE | Noted: 2025-07-07

## 2025-07-07 PROCEDURE — 99214 OFFICE O/P EST MOD 30 MIN: CPT

## 2025-07-07 RX ORDER — MELOXICAM 15 MG/1
15 TABLET ORAL
Qty: 30 | Refills: 1 | Status: ACTIVE | COMMUNITY
Start: 2025-07-07 | End: 1900-01-01

## 2025-07-11 ENCOUNTER — APPOINTMENT (OUTPATIENT)
Dept: GASTROENTEROLOGY | Facility: CLINIC | Age: 70
End: 2025-07-11
Payer: MEDICARE

## 2025-07-11 VITALS
BODY MASS INDEX: 35.54 KG/M2 | RESPIRATION RATE: 14 BRPM | WEIGHT: 158 LBS | HEIGHT: 56 IN | HEART RATE: 80 BPM | OXYGEN SATURATION: 99 % | TEMPERATURE: 98 F

## 2025-07-11 PROBLEM — K21.9 GASTROESOPHAGEAL REFLUX DISEASE WITHOUT ESOPHAGITIS: Status: ACTIVE | Noted: 2025-07-11

## 2025-07-11 PROBLEM — K29.50 OTHER CHRONIC GASTRITIS: Status: ACTIVE | Noted: 2025-07-11

## 2025-07-11 PROBLEM — R10.13 EPIGASTRIC DISCOMFORT: Status: ACTIVE | Noted: 2025-07-11

## 2025-07-11 PROBLEM — K29.60 OTHER SPECIFIED GASTRITIS: Status: ACTIVE | Noted: 2025-07-11

## 2025-07-11 PROBLEM — K21.9 GERD (GASTROESOPHAGEAL REFLUX DISEASE): Status: ACTIVE | Noted: 2025-07-11

## 2025-07-11 PROCEDURE — 99214 OFFICE O/P EST MOD 30 MIN: CPT

## 2025-07-11 RX ORDER — PANTOPRAZOLE 40 MG/1
40 TABLET, DELAYED RELEASE ORAL DAILY
Qty: 30 | Refills: 3 | Status: ACTIVE | COMMUNITY
Start: 2025-07-11 | End: 1900-01-01

## 2025-07-11 RX ORDER — FAMOTIDINE 40 MG/1
40 TABLET, FILM COATED ORAL
Qty: 30 | Refills: 6 | Status: ACTIVE | COMMUNITY
Start: 2025-07-11 | End: 1900-01-01

## 2025-08-04 ENCOUNTER — RX CHANGE (OUTPATIENT)
Age: 70
End: 2025-08-04

## 2025-08-04 RX ORDER — PANTOPRAZOLE 40 MG/1
40 TABLET, DELAYED RELEASE ORAL
Qty: 90 | Refills: 2 | Status: ACTIVE | COMMUNITY
Start: 1900-01-01 | End: 1900-01-01

## 2025-09-08 ENCOUNTER — APPOINTMENT (OUTPATIENT)
Dept: PAIN MANAGEMENT | Facility: CLINIC | Age: 70
End: 2025-09-08